# Patient Record
Sex: FEMALE | Race: WHITE | NOT HISPANIC OR LATINO | Employment: OTHER | URBAN - METROPOLITAN AREA
[De-identification: names, ages, dates, MRNs, and addresses within clinical notes are randomized per-mention and may not be internally consistent; named-entity substitution may affect disease eponyms.]

---

## 2017-01-04 ENCOUNTER — OFFICE VISIT (OUTPATIENT)
Dept: FAMILY MEDICINE | Facility: CLINIC | Age: 62
End: 2017-01-04
Payer: COMMERCIAL

## 2017-01-04 VITALS
DIASTOLIC BLOOD PRESSURE: 82 MMHG | WEIGHT: 119.69 LBS | SYSTOLIC BLOOD PRESSURE: 127 MMHG | BODY MASS INDEX: 22.6 KG/M2 | HEART RATE: 82 BPM | OXYGEN SATURATION: 98 % | HEIGHT: 61 IN | TEMPERATURE: 98 F

## 2017-01-04 DIAGNOSIS — R29.898 RIGHT ARM WEAKNESS: ICD-10-CM

## 2017-01-04 DIAGNOSIS — Z13.9 SCREENING: ICD-10-CM

## 2017-01-04 DIAGNOSIS — F19.982 DRUG INDUCED INSOMNIA: Primary | ICD-10-CM

## 2017-01-04 DIAGNOSIS — G20.A1 PARKINSON'S DISEASE: ICD-10-CM

## 2017-01-04 PROCEDURE — 99213 OFFICE O/P EST LOW 20 MIN: CPT | Mod: S$GLB,,, | Performed by: FAMILY MEDICINE

## 2017-01-04 PROCEDURE — 99999 PR PBB SHADOW E&M-EST. PATIENT-LVL III: CPT | Mod: PBBFAC,,, | Performed by: FAMILY MEDICINE

## 2017-01-04 RX ORDER — TEMAZEPAM 30 MG/1
CAPSULE ORAL
Qty: 30 CAPSULE | Refills: 5 | Status: SHIPPED | OUTPATIENT
Start: 2017-01-04 | End: 2017-05-25

## 2017-01-04 NOTE — PROGRESS NOTES
"Subjective:      Patient ID: Awilda Segovia is a 61 y.o. female.    Chief Complaint: insomnia  HPI she has had insomnia for about a year and she has been on restoril for at least 6 months.  She states that this dose has helped her a lot with this.  She also uses 2 gabapentin for this at night.  She has used meloxicam or advil for her pain on the right side at times.     Health Maintenance Due   Topic Date Due    Hepatitis C Screening  1955    TETANUS VACCINE  04/07/1971    Pap Smear  04/07/1974    Zoster Vaccine  04/07/2013    Mammogram  03/04/2017         Review of Systems    Objective:     Visit Vitals    /82    Pulse 82    Temp 97.9 °F (36.6 °C) (Oral)    Ht 5' 1" (1.549 m)    Wt 54.3 kg (119 lb 11.4 oz)    SpO2 98%    BMI 22.62 kg/m2       Physical Exam   Constitutional: She is oriented to person, place, and time. She appears well-developed and well-nourished. She appears distressed.   HENT:   Head: Normocephalic and atraumatic.   Eyes: EOM are normal. Pupils are equal, round, and reactive to light.   Neck: Normal range of motion. Neck supple.   Cardiovascular: Regular rhythm.    No murmur heard.  Pulmonary/Chest: Effort normal. No respiratory distress. She has no wheezes. She has no rales.   Abdominal: Soft. She exhibits no distension. There is no tenderness.   Neurological: She is alert and oriented to person, place, and time.   She has weakness on the right arm.   Skin: She is diaphoretic.       Assessment:     1. Drug induced insomnia    2. Screening    3. Parkinson's disease    4. Right arm weakness        Plan:   Diagnoses and all orders for this visit:    Drug induced insomnia  -     temazepam (RESTORIL) 30 mg capsule; TAKE 1 CAPSULE BY MOUTH EVERY NIGHT AT BEDTIME IF NEEDED    Screening  -     Comprehensive metabolic panel; Future  -     Lipid panel; Future  -     Hepatitis C antibody; Future    Parkinson's disease  -     Ambulatory Referral to Physical/Occupational Therapy    Right " arm weakness  -     Ambulatory Referral to Physical/Occupational Therapy

## 2017-01-04 NOTE — MR AVS SNAPSHOT
[facility]  [address] [facility] [address] [address]  Phone: [phone]  Fax: [phone]                  Awilda Segovia   2017 10:40 AM   Office Visit    Description:  Female : 1955   Provider:  Gato Ivan MD   Department:  [facility]           Diagnoses this Visit        Comments    Drug induced insomnia    -  Primary     Screening         Parkinson's disease         Right arm weakness                To Do List           Future Appointments        Provider Department Dept Phone    2017 11:50 AM LABORATORY, [place] [facility] [phone]    2017 11:20 AM Pamela Garcia MD [facility] Neurology [phone]      Goals (5 Years of Data)     None       These Medications        Disp Refills Start End    temazepam (RESTORIL) 30 mg capsule 30 capsule 5 2017     TAKE 1 CAPSULE BY MOUTH EVERY NIGHT AT BEDTIME IF NEEDED    Pharmacy: [facility]/pharmacy #[id number] - [place], [place] - [address] #: [phone]         [facility] On Call     [facility] On Call Nurse Care Line -  Assistance  Registered nurses in the [facility] On Call Center provide clinical advisement, health education, appointment booking, and other advisory services.  Call for this free service at [phone].             Medications           Message regarding Medications     Verify the changes and/or additions to your medication regime listed below are the same as discussed with your clinician today.  If any of these changes or additions are incorrect, please notify your healthcare provider.             Verify that the below list of medications is an accurate representation of the medications you are currently taking.  If none reported, the list may be blank. If incorrect, please contact your healthcare provider. Carry this list with you in case of emergency.           Current Medications     calcium-vitamin D3 (CALCIUM 500 + D) 500 mg(1,250mg) -200 unit per tablet  "Take 1 tablet by mouth 3 (three) times daily with meals.    carbidopa (LODOSYN) 25 mg tablet Take 1 tablet (25 mg total) by mouth 4 (four) times daily.    carbidopa-levodopa  mg (SINEMET CR)  mg TbSR Take 2 tablets by mouth 3 (three) times daily.    citalopram (CELEXA) 10 MG tablet Take 1 tablet (10 mg total) by mouth once daily.    cyclobenzaprine (FLEXERIL) 5 MG tablet Take 5 mg by mouth 3 (three) times daily as needed for Muscle spasms.    ESTRING 2 mg vaginal ring INSERT 1 VAGINAL RING(S) EVERY 3 MONTHS    gabapentin (NEURONTIN) 100 MG capsule Take 1-2 capsules (100-200 mg total) by mouth every evening.    temazepam (RESTORIL) 30 mg capsule TAKE 1 CAPSULE BY MOUTH EVERY NIGHT AT BEDTIME IF NEEDED           Clinical Reference Information           Vital Signs - Last Recorded  Most recent update: 1/4/2017 10:43 AM by Leyla Dinh MA    BP Pulse Temp Ht Wt SpO2    127/82 82 97.9 °F (36.6 °C) (Oral) 5' 1" (1.549 m) 54.3 kg (119 lb 11.4 oz) 98%    BMI                22.62 kg/m2          Blood Pressure          Most Recent Value    BP  127/82      Allergies as of 1/4/2017     Silver Nitrate    Demerol [Meperidine]    Reglan [Metoclopramide Hcl]    Risperdal [Risperidone]      Immunizations Administered on Date of Encounter - 1/4/2017     None      Orders Placed During Today's Visit      Normal Orders This Visit    Ambulatory Referral to Physical/Occupational Therapy     Future Labs/Procedures Expected by Expires    Comprehensive metabolic panel  1/4/2017 (Approximate) 1/4/2018    Hepatitis C antibody  1/4/2017 (Approximate) 1/4/2018    Lipid panel  1/4/2017 (Approximate) 1/4/2018      "

## 2017-01-06 ENCOUNTER — LAB VISIT (OUTPATIENT)
Dept: LAB | Facility: HOSPITAL | Age: 62
End: 2017-01-06
Attending: FAMILY MEDICINE
Payer: COMMERCIAL

## 2017-01-06 DIAGNOSIS — Z13.9 SCREENING: ICD-10-CM

## 2017-01-06 LAB
ALBUMIN SERPL BCP-MCNC: 4.4 G/DL
ALP SERPL-CCNC: 72 U/L
ALT SERPL W/O P-5'-P-CCNC: 6 U/L
ANION GAP SERPL CALC-SCNC: 8 MMOL/L
AST SERPL-CCNC: 27 U/L
BILIRUB SERPL-MCNC: 0.6 MG/DL
BUN SERPL-MCNC: 20 MG/DL
CALCIUM SERPL-MCNC: 9.7 MG/DL
CHLORIDE SERPL-SCNC: 104 MMOL/L
CHOLEST/HDLC SERPL: 2.3 {RATIO}
CO2 SERPL-SCNC: 29 MMOL/L
CREAT SERPL-MCNC: 0.8 MG/DL
EST. GFR  (AFRICAN AMERICAN): >60 ML/MIN/1.73 M^2
EST. GFR  (NON AFRICAN AMERICAN): >60 ML/MIN/1.73 M^2
GLUCOSE SERPL-MCNC: 91 MG/DL
HDL/CHOLESTEROL RATIO: 42.9 %
HDLC SERPL-MCNC: 168 MG/DL
HDLC SERPL-MCNC: 72 MG/DL
LDLC SERPL CALC-MCNC: 84.2 MG/DL
NONHDLC SERPL-MCNC: 96 MG/DL
POTASSIUM SERPL-SCNC: 4.5 MMOL/L
PROT SERPL-MCNC: 7.6 G/DL
SODIUM SERPL-SCNC: 141 MMOL/L
TRIGL SERPL-MCNC: 59 MG/DL

## 2017-01-06 PROCEDURE — 36415 COLL VENOUS BLD VENIPUNCTURE: CPT | Mod: PO

## 2017-01-06 PROCEDURE — 86803 HEPATITIS C AB TEST: CPT

## 2017-01-06 PROCEDURE — 80053 COMPREHEN METABOLIC PANEL: CPT

## 2017-01-06 PROCEDURE — 80061 LIPID PANEL: CPT

## 2017-01-09 LAB — HCV AB SERPL QL IA: NEGATIVE

## 2017-01-31 ENCOUNTER — OFFICE VISIT (OUTPATIENT)
Dept: NEUROLOGY | Facility: CLINIC | Age: 62
End: 2017-01-31
Payer: COMMERCIAL

## 2017-01-31 VITALS
HEIGHT: 61 IN | BODY MASS INDEX: 22.76 KG/M2 | HEART RATE: 106 BPM | DIASTOLIC BLOOD PRESSURE: 86 MMHG | SYSTOLIC BLOOD PRESSURE: 133 MMHG | WEIGHT: 120.56 LBS

## 2017-01-31 DIAGNOSIS — R47.89: ICD-10-CM

## 2017-01-31 DIAGNOSIS — G20.A1 PARKINSON'S DISEASE: Primary | ICD-10-CM

## 2017-01-31 PROCEDURE — 99999 PR PBB SHADOW E&M-EST. PATIENT-LVL III: CPT | Mod: PBBFAC,,, | Performed by: PSYCHIATRY & NEUROLOGY

## 2017-01-31 PROCEDURE — 99214 OFFICE O/P EST MOD 30 MIN: CPT | Mod: S$GLB,,, | Performed by: PSYCHIATRY & NEUROLOGY

## 2017-01-31 RX ORDER — DIAZEPAM 2 MG/1
1-2 TABLET ORAL EVERY 6 HOURS PRN
Qty: 120 TABLET | Refills: 4 | Status: SHIPPED | OUTPATIENT
Start: 2017-01-31 | End: 2018-01-19

## 2017-01-31 RX ORDER — CYCLOBENZAPRINE HCL 5 MG
TABLET ORAL
Qty: 90 TABLET | Refills: 5 | Status: SHIPPED | OUTPATIENT
Start: 2017-01-31 | End: 2017-09-24 | Stop reason: SDUPTHER

## 2017-01-31 RX ORDER — DIAZEPAM 2 MG/1
2 TABLET ORAL EVERY 6 HOURS PRN
COMMUNITY
End: 2017-01-31 | Stop reason: SDUPTHER

## 2017-01-31 NOTE — MR AVS SNAPSHOT
[facility]  [address]  [facility] [address]  Phone: [phone]  Fax: [phone]                  Awilda CHAPA Luca   2017 11:20 AM   Office Visit    Description:  Female : 1955   Provider:  Pamela Garcia MD   Department:  [facility]           Diagnoses this Visit        Comments    Parkinson's disease    -  Primary            To Do List           Goals (5 Years of Data)     None       These Medications        Disp Refills Start End    diazePAM (VALIUM) 2 MG tablet 120 tablet 4 2017     Take 0.5-1 tablets (1-2 mg total) by mouth every 6 (six) hours as needed for Anxiety. - Oral    Pharmacy: [facility]/pharmacy #[id number] - [place] - [address] #: [phone]         [facility] On Call     [facility] On Call Nurse Care Line -  Assistance  Registered nurses in the [facility] On Call Center provide clinical advisement, health education, appointment booking, and other advisory services.  Call for this free service at [phone].             Medications           Message regarding Medications     Verify the changes and/or additions to your medication regime listed below are the same as discussed with your clinician today.  If any of these changes or additions are incorrect, please notify your healthcare provider.        START taking these NEW medications        Refills    diazePAM (VALIUM) 2 MG tablet 4    Sig: Take 0.5-1 tablets (1-2 mg total) by mouth every 6 (six) hours as needed for Anxiety.    Class: Normal    Route: Oral           Verify that the below list of medications is an accurate representation of the medications you are currently taking.  If none reported, the list may be blank. If incorrect, please contact your healthcare provider. Carry this list with you in case of emergency.           Current Medications     calcium-vitamin D3 (CALCIUM 500 + D) 500 mg(1,250mg) -200 unit per tablet Take 1 tablet by mouth 3 (three) times daily with meals.     "carbidopa (LODOSYN) 25 mg tablet Take 1 tablet (25 mg total) by mouth 4 (four) times daily.    carbidopa-levodopa  mg (SINEMET CR)  mg TbSR Take 2 tablets by mouth 3 (three) times daily.    citalopram (CELEXA) 10 MG tablet Take 1 tablet (10 mg total) by mouth once daily.    cyclobenzaprine (FLEXERIL) 5 MG tablet TAKE 1 TABLET BY MOUTH 3 TIMES A DAY AS NEEDED FOR MUSCLE SPASMS.    diazePAM (VALIUM) 2 MG tablet Take 0.5-1 tablets (1-2 mg total) by mouth every 6 (six) hours as needed for Anxiety.    ESTRING 2 mg vaginal ring INSERT 1 VAGINAL RING(S) EVERY 3 MONTHS    gabapentin (NEURONTIN) 100 MG capsule Take 1-2 capsules (100-200 mg total) by mouth every evening.    temazepam (RESTORIL) 30 mg capsule TAKE 1 CAPSULE BY MOUTH EVERY NIGHT AT BEDTIME IF NEEDED           Clinical Reference Information           Vital Signs - Last Recorded  Most recent update: 1/31/2017 11:34 AM by Pratibha Raymond MA    BP Pulse Ht Wt BMI    133/86 106 5' 1" (1.549 m) 54.7 kg (120 lb 9.5 oz) 22.79 kg/m2      Blood Pressure          Most Recent Value    BP  133/86      Allergies as of 1/31/2017     Silver Nitrate    Demerol [Meperidine]    Reglan [Metoclopramide Hcl]    Risperdal [Risperidone]      Immunizations Administered on Date of Encounter - 1/31/2017     None      Instructions    In order to try and differentiate between rigid-type parkinson's disease and one of the atypical types, we need to know your response to levodopa.  Does this medication DO anything?    To test this, you'll need an observer (friend or ).  1.  Have your observer watch and video tape you waving with your hands, then tapping your feet.  2.  Take TWO carbidopa-levodopa  and one carbidopa.  3.  Wait 2 hours, then have your observer watch and video tape you waving your hands again.  Also tapping your feet.      Do this test on at least TWO mornings.    If you get too nauseated or vomit, don't try the test again.  Just let me know.    Even " if your observer cannot tell a difference in your movements on and off the medication, bring video to next appointment for Karina or DARRYN to review.

## 2017-01-31 NOTE — PATIENT INSTRUCTIONS
In order to try and differentiate between rigid-type parkinson's disease and one of the atypical types, we need to know your response to levodopa.  Does this medication DO anything?    To test this, you'll need an observer (friend or ).  1.  Have your observer watch and video tape you waving with your hands, then tapping your feet.  2.  Take TWO carbidopa-levodopa  and one carbidopa.  3.  Wait 2 hours, then have your observer watch and video tape you waving your hands again.  Also tapping your feet.      Do this test on at least TWO mornings.    If you get too nauseated or vomit, don't try the test again.  Just let me know.    Even if your observer cannot tell a difference in your movements on and off the medication, bring video to next appointment for Karina or DARRYN to review.

## 2017-01-31 NOTE — PROGRESS NOTES
"Awilda CONSTANTINO Chief Complaints during this visit:  f/u Patient visit for  Parkinsonism      Referring Physician:     Gato Ivan MD  [address] [place] [address]       Primary Care Physician:  Gato Ivan MD  [address] [facility] [address]      History of present illness:   61 y.o. W seen in f/u for Parkinsonism.  Accompanied by a friend.  Has been seeing Karina in the interim who has been wondering if patient may have MSA.  One fall last month when tripping at top of stairs.    Lability has improved with celexa.    Sinemet CR + carbidopa has reduced nausea and she is tolerating.  However not sure the 4th dose of sinemet has had any effect.    Voice has been changing in last couple of weeks.  No trouble with swallowing.    Home orthostatics:  1/29:  130/70, 96 laying down, 122/66, 94 standing  1/30:  126/70, 92 laying down, 110/70, 100 standing    Interval history 5/26/16:  Complains of severe spasm mid back every afternoon into the evening for past couple months.   says she is tender on spinous process, but unable to detect a spasming muscle.  Finally goes away when her restoril takes effect.  Mobic helps a little.  Wakes up intermittently at night, 1-3am, wide awake.  Restless and difficulty getting comfortable in the evenings.  Says feels like "locusts" all over her.    Right hand ring finger as well as foot tingling and numb.  Never started the neudexta because of potential interaction with selegiline.  Despite this, she has done better with tears.    From my note 2/12/16:  Annoyed by a restless feeling in her right foot that worsens as she prepares to bed.  Also having severe insomnia.  Right hand is still swollen and had poor dexterity, and right foot rolling in, but no pain in right side any longer.  Cries easily, whether sad or happy.  She denies feeling depressed and is resistant to going back on prozac.    From my note 12/2/15:  ... seen in consultation at " "the request of  Dr. Segovia () for evaluation of joint pains and fatigue.  Symptoms started as joint pain, swelling in right hand about 6-8 months ago.  When typing, letters skipped with right hand.  Otherwise feels "fine."  Right arm, hand, leg, foot still have pain, intermittently.  Yoga feels good.  Seen by Kelley and given presumptive diagnosis of PD.  Had control of depression with wellbutrin and prozac, now off in past 3 weeks.  Recent active dreaming (screaming, laughing, kicks).  Not falling asleep any more.  No problems with voice.       II.  Review of systems:  As in HPI, otherwise, balance 3 systems reviewed and are negative.    III.  Past Medical History   Diagnosis Date    H/O hysterectomy for benign disease     Menopause present     Osteopenia     Parkinson disease 12/2015    Sprain of left foot      2008     Family History   Problem Relation Age of Onset    Diabetes Mother     Cataracts Mother     Hypertension Mother     Cancer Maternal Aunt      breast    Cancer Maternal Uncle      brain , bone     Cancer Paternal Aunt      uterine    Diabetes Maternal Grandfather     Cataracts Father      Social History     Social History    Marital status:      Spouse name: N/A    Number of children: N/A    Years of education: N/A     Social History Main Topics    Smoking status: Never Smoker    Smokeless tobacco: Never Used    Alcohol use 0.6 oz/week     1 Glasses of wine per week      Comment: social    Drug use: No    Sexual activity: Yes     Partners: Male     Other Topics Concern    None     Social History Narrative    Works for [employer]          Current Outpatient Prescriptions   Medication Sig Dispense Refill    calcium-vitamin D3 (CALCIUM 500 + D) 500 mg(1,250mg) -200 unit per tablet Take 1 tablet by mouth 3 (three) times daily with meals.      carbidopa (LODOSYN) 25 mg tablet Take 1 tablet (25 mg total) by mouth 4 (four) times daily. 120 tablet 11    " "carbidopa-levodopa  mg (SINEMET CR)  mg TbSR Take 2 tablets by mouth 3 (three) times daily. (Patient taking differently: Take 1 tablet by mouth 3 (three) times daily. ) 540 tablet 3    citalopram (CELEXA) 10 MG tablet Take 1 tablet (10 mg total) by mouth once daily. 30 tablet 11    cyclobenzaprine (FLEXERIL) 5 MG tablet TAKE 1 TABLET BY MOUTH 3 TIMES A DAY AS NEEDED FOR MUSCLE SPASMS. 90 tablet 5    diazePAM (VALIUM) 2 MG tablet Take 2 mg by mouth every 6 (six) hours as needed for Anxiety. Take half tablet by month every 12 hours as needed      ESTRING 2 mg vaginal ring INSERT 1 VAGINAL RING(S) EVERY 3 MONTHS  1    gabapentin (NEURONTIN) 100 MG capsule Take 1-2 capsules (100-200 mg total) by mouth every evening. 60 capsule 11    temazepam (RESTORIL) 30 mg capsule TAKE 1 CAPSULE BY MOUTH EVERY NIGHT AT BEDTIME IF NEEDED 30 capsule 5    [DISCONTINUED] venlafaxine (EFFEXOR-XR) 75 MG 24 hr capsule Take 1 capsule (75 mg total) by mouth once daily. 30 capsule 11     No current facility-administered medications for this visit.       PRIOR NEURO MEDICATIONS TRIED:  na    Review of patient's allergies indicates:   Allergen Reactions    Silver nitrate Anaphylaxis     Put wholes skin     Demerol [meperidine]      Can worsen PD    Reglan [metoclopramide hcl]      Can worsen PD    Risperdal [risperidone]      Can worsen PD         IV. Physical Exam (Includes Motor Unified Parkinson's Disease Rating Scale 2008)  Patient taking PD meds?   selegiline  Time since last dose:      na    Vitals:    01/31/17 1132   BP: 133/86   Pulse: 106   Weight: 54.7 kg (120 lb 9.5 oz)   Height: 5' 1" (1.549 m)       General appearance: Well nourished, well developed, no acute distress       -------------------------------------------------------------  Affect: full       Orientation to time & place:  Oriented to time, place, person and situation       Attention & concentration:  Normal attention span and concentration     "   Memory:  Recent and remote memory intact  Language:  Spontaneous, fluent; able to repeat and name objects        Fund of knowledge:  Aware of current events        -------------------------------------------------------  Cranial nerves: normal visual acuity, visual fields full, optic discs not visualized, pupils equal round and reactive, extraocular movements intact,       facial sensation intact, face symmetrical, hearing intact to whisper, palate raises midline, shoulder shrug strength normal, tongue protrudes midline.        -------------------------------------------------------  Muscle Bulk: all 4 extremities normal                                                   Muscle strength:  5/5 in all 4 extremities        No pronator drift  Sensation: All extremities grossly intact to touch          Deep tendon Reflexes: 2 bilateral biceps, triceps and patella        --------------------------------------------------------------  Unified Parkinson's Disease Rating Scale (motor part only)      UPDRS Motor Examination      Speech  0 - Normal.   Facial Expression  0 - Normal.   Rigidity     Neck 0 - Absent.   Upper Extremity: Right 2 - Mild or moderate.   Upper Extremity: Left 0 - Absent.   Lower Extremity: Right 2 - Mild or moderate.   Lower Extremity: Left 0 - Absent.     Finger Taps      right 3 - Severely impaired. Frequent hesitation in initiating movements or arrests in ongoing movement.   left 0 - Normal.   Hand Movements      right 3 - Severely impaired. Frequent hesitation in initiating movements or arrests in ongoing movement.   left 0 - Normal.   Pronation/supination of Hands      right 3 - Severely impaired. Frequent hesitation in initiating movements or arrests in ongoing movement.   left 0 - Normal.     Toe Tapping    right 3 - Severely impaired. Frequent hesitation in initiating movements or arrests in ongoing movement.   left 0 - Normal.     Leg Agility      right 2 - Moderately impaired. Definite and  early fatiguing. May have occasional arrests in movement.   left 0 - Normal.   Arising from Chair  0 - Normal.   Posture  0 - Normal erect.   Gait  1 - Walks slowly, may shuffle with short steps, but no festination (hastening steps) or propulsion.   Freezing of gait 0: Normal: No freezing.    Postural Stability (Response to sudden, strong posterior displacement produced by pull on shoulders while patient erect with eyes open and feet slightly apart.   1 - Retropulsion, but recovers unaided.    Body Bradykinesia and Hypokinesia (Combining slowness, hesitancy, decreased armswing, small amplitude, and poverty of movement in general)  1 - Minimal slowness, giving movement a deliberate character; could be normal for some persons. Possibly reduced amplitude.     Tremor at Rest:      Face, lips, chin 0 - Absent.    Hands:      right 0 - Absent.    left 0 - Absent.    Feet:     right 0 - Absent.    left 0 - Absent.    Constancy of REST tremor: 0: Normal: No tremor.   Postural tremor:    right 0 - Absent.    left 0 - Absent.    Kinetic tremor:    right 0 - Absent.    left 0 - Absent.    Dyskinesias present? No       Total Motor UPDRS:  22      V.  Laboratory/ Radiological Data:       MRI brain 2015 personally reviewed and midbrain appears normal:            Lab Results   Component Value Date    TSH 1.668 06/24/2014         VI. Medical Decision Making  Diagnosis: Parkinson's disease        UPDRS-ADL Scale:  90% Completely independent. Able to do all chores with some degree of slowness, difficulty and impairment. Might take twice as long. Beginning to be aware of difficulty.                  Assessment:    1.  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  I don't think MSA (seems too asymmetrical), but CBD is a possibility.  No ocular or midbrain pathology to suggest PSP.  Levodopa response (or lack thereof) is unclear.  2. Speech changes- loss of lou/ prosody, sounding slow and robotic-like  3.  "Restlessness, "creepy crawlies" may be an RLS variant  4. PD-related pain  5.  Pseudobulbar affect    Treatment plan:  1.  Levodopa test.  See instructions.           2.  Speech therapy           3.                          Tests ordered during this visit:        No orders of the defined types were placed in this encounter.        No Follow-up on file. (has appt 3/1 with Karina)  "

## 2017-01-31 NOTE — LETTER
February 2, 2017      Gato Ivan MD  [address] [facility] [address]           [facility]  [address]  [place] [address]  Phone: [phone]  Fax: [phone]          Patient: Awilda Segovia   MR Number: [id number]   YOB: 1955   Date of Visit: 1/31/2017       Dear Dr. Gato Ivan:    Thank you for referring Awilda Segovia to me for evaluation. Attached you will find relevant portions of my assessment and plan of care.    If you have questions, please do not hesitate to call me. I look forward to following Awilda Segovia along with you.    Sincerely,    Pamela Garcia MD    Enclosure  CC:  No Recipients    If you would like to receive this communication electronically, please contact [email or web address] or [phone] to request more information on [facility] Link access.    For providers and/or their staff who would like to refer a patient to [facility], please contact us through our one-stop-shop provider referral line, [facility], at [phone].    If you feel you have received this communication in error or would no longer like to receive these types of communications, please e-mail [email or web address]

## 2017-03-01 ENCOUNTER — OFFICE VISIT (OUTPATIENT)
Dept: NEUROLOGY | Facility: CLINIC | Age: 62
End: 2017-03-01
Payer: COMMERCIAL

## 2017-03-01 VITALS
BODY MASS INDEX: 22.28 KG/M2 | DIASTOLIC BLOOD PRESSURE: 84 MMHG | HEART RATE: 106 BPM | HEIGHT: 61 IN | WEIGHT: 118 LBS | SYSTOLIC BLOOD PRESSURE: 137 MMHG | RESPIRATION RATE: 16 BRPM | TEMPERATURE: 97 F

## 2017-03-01 DIAGNOSIS — F48.2 PBA (PSEUDOBULBAR AFFECT): ICD-10-CM

## 2017-03-01 DIAGNOSIS — G20.A1 PARKINSON'S DISEASE: Primary | ICD-10-CM

## 2017-03-01 PROCEDURE — 99215 OFFICE O/P EST HI 40 MIN: CPT | Mod: S$GLB,,, | Performed by: NURSE PRACTITIONER

## 2017-03-01 PROCEDURE — 99999 PR PBB SHADOW E&M-EST. PATIENT-LVL III: CPT | Mod: PBBFAC,,, | Performed by: NURSE PRACTITIONER

## 2017-03-01 NOTE — PROGRESS NOTES
Name: Awilda Segovia  MRN: [id number]   CSN: [id number]      Date: 03/01/2017    Referring physician:  No referring provider defined for this encounter.    Chief Complaint / Interval History: Follow-up      History of Present Illness (HPI):    62 yo female with parkinsonism, last seen in office by Dr. Garcia. At that time, she was asked to take video without and with cd/ld 2 hours later. She brings 2 videos to view. She is accompanied by her friend today.     Mornings are consistently her best time. She is not sure if she has always felt better in the mornings. Not sure if has anything to do with the 2nd dose of cd/ld.  After noon, she begins to go down.   She feels her voice has changed since the last time I saw her. She had ST eval this week and was told she does not have lou and pacing. She was told she does not need LOUD as it is not a volume or breath problem. She begins her first ST session tomorrow. She was told she has particular issues with words that start with W and M. Friend adds that she she seems to stammer on the telephone in the evenings. Friend usually only speaks to her on the phone at night.   She is also particiapting in PT and yoga.    She can now put her earrings on with her RH.  She can also pick something off the floor with her RH. These are both things she could not do a few months ago. She is not sure if this is cd/ld or PT. She says they have really made progress with her ROM with PT.     She takes 2 cd/ld CR + cd at 7 AM and the remainder of the doses are 1 cd/ld CR + cd (at noon, 6P and 9:30P).      She has some tremor in the RH and R foot. She also describes an internal truncal tremor that she began to notice this week.   Denies stiffness for the most part on the R side. States that PT has really helped. Has very much helped ROM.   Definitely feels slow.   Balance is off. No falls recently. She does shuffle. Uses cane. No other devices at this point.       From Dr. Garcia's note  "1-31-17...  History of present illness:   61 y.o. W seen in f/u for Parkinsonism. Accompanied by a friend. Has been seeing Karina in the interim who has been wondering if patient may have MSA. One fall last month when tripping at top of stairs.     Lability has improved with celexa.     Sinemet CR + carbidopa has reduced nausea and she is tolerating. However not sure the 4th dose of sinemet has had any effect.     Voice has been changing in last couple of weeks.  No trouble with swallowing.     Home orthostatics:  1/29: 130/70, 96 laying down, 122/66, 94 standing  1/30: 126/70, 92 laying down, 110/70, 100 standing     Assessment:    1. 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 I don't think MSA (seems too asymmetrical), but CBD is a possibility. No ocular or midbrain pathology to suggest PSP. Levodopa response (or lack thereof) is unclear.  2. Speech changes- loss of lou/ prosody, sounding slow and robotic-like  3. Restlessness, "creepy crawlies" may be an RLS variant  4. PD-related pain  5. Pseudobulbar affect     Treatment plan:  1. Levodopa test. See instructions.    2. Speech therapy    Interval history 5/26/16:  Complains of severe spasm mid back every afternoon into the evening for past couple months.  says she is tender on spinous process, but unable to detect a spasming muscle. Finally goes away when her restoril takes effect. Mobic helps a little.  Wakes up intermittently at night, 1-3am, wide awake.  Restless and difficulty getting comfortable in the evenings. Says feels like "locusts" all over her.   Right hand ring finger as well as foot tingling and numb.  Never started the neudexta because of potential interaction with selegiline. Despite this, she has done better with tears.     From my note 2/12/16:  Annoyed by a restless feeling in her right foot that worsens as she prepares to bed. Also having severe insomnia.  Right hand is still swollen and had poor dexterity, " "and right foot rolling in, but no pain in right side any longer.  Cries easily, whether sad or happy. She denies feeling depressed and is resistant to going back on prozac.     From my note 12/2/15:  ... seen in consultation at the request of Dr. Segovia () for evaluation of joint pains and fatigue. Symptoms started as joint pain, swelling in right hand about 6-8 months ago. When typing, letters skipped with right hand. Otherwise feels "fine."  Right arm, hand, leg, foot still have pain, intermittently. Yoga feels good.  Seen by Kelley and given presumptive diagnosis of PD.  Had control of depression with wellbutrin and prozac, now off in past 3 weeks.  Recent active dreaming (screaming, laughing, kicks). Not falling asleep any more.  No problems with voice.      Nonmotor/Premotor ROS:  Dysphagia (HENT)?No  RBD/sleep issues (Constitutional)?Yes  Depression/anxiety (Psychiatric)?better  Fatigue (Constitutional)?Yes- in afternoon  Constipation (GI)?Yes- Miralax very much helps  Urinary issues ()?No  Orthostasis (Cardiovascular)?Yes- with position change- all the time she makes a position change- does not have syncope  Falls (Musculoskeletal)?No- no recent  Cognitive impairment (Neurologic)?No  Psychoses (Psychiatric)?Yes- ddi but not in a very long time  Pain/Paresthesia (Neurologic)?Yes- "little on the numb side (Right side)  Visual changes (Eyes)?Yes- blurred  Stridor / SOB (Pulm)?No    Past Medical History: The patient  has a past medical history of H/O hysterectomy for benign disease; Menopause present; Osteopenia; Parkinson disease (12/2015); and Sprain of left foot.    Social History: The patient  reports that she has never smoked. She has never used smokeless tobacco. She reports that she drinks about 0.6 oz of alcohol per week  She reports that she does not use illicit drugs.    Family History: Their family history includes Cancer in her maternal aunt, maternal uncle, and paternal aunt; Cataracts in her " "father and mother; Diabetes in her maternal grandfather and mother; Hypertension in her mother.    Allergies: Silver nitrate; Demerol [meperidine]; Reglan [metoclopramide hcl]; and Risperdal [risperidone]     Meds:   Current Outpatient Prescriptions on File Prior to Visit   Medication Sig Dispense Refill    calcium-vitamin D3 (CALCIUM 500 + D) 500 mg(1,250mg) -200 unit per tablet Take 1 tablet by mouth 3 (three) times daily with meals.      carbidopa (LODOSYN) 25 mg tablet Take 1 tablet (25 mg total) by mouth 4 (four) times daily. 120 tablet 11    carbidopa-levodopa  mg (SINEMET CR)  mg TbSR Take 2 tablets by mouth 3 (three) times daily. (Patient taking differently: Take 1 tablet by mouth 4 (four) times daily. ) 540 tablet 3    citalopram (CELEXA) 10 MG tablet Take 1 tablet (10 mg total) by mouth once daily. 30 tablet 11    cyclobenzaprine (FLEXERIL) 5 MG tablet TAKE 1 TABLET BY MOUTH 3 TIMES A DAY AS NEEDED FOR MUSCLE SPASMS. 90 tablet 5    diazePAM (VALIUM) 2 MG tablet Take 0.5-1 tablets (1-2 mg total) by mouth every 6 (six) hours as needed for Anxiety. 120 tablet 4    ESTRING 2 mg vaginal ring INSERT 1 VAGINAL RING(S) EVERY 3 MONTHS  1    gabapentin (NEURONTIN) 100 MG capsule Take 1-2 capsules (100-200 mg total) by mouth every evening. 60 capsule 11    temazepam (RESTORIL) 30 mg capsule TAKE 1 CAPSULE BY MOUTH EVERY NIGHT AT BEDTIME IF NEEDED 30 capsule 5    [DISCONTINUED] venlafaxine (EFFEXOR-XR) 75 MG 24 hr capsule Take 1 capsule (75 mg total) by mouth once daily. 30 capsule 11     No current facility-administered medications on file prior to visit.        Exam:  /84 (BP Location: Left arm, Patient Position: Sitting, BP Method: Automatic)  Pulse 106  Temp 97 °F (36.1 °C) (Oral)   Resp 16  Ht 5' 1" (1.549 m)  Wt 53.5 kg (118 lb)  BMI 22.3 kg/m2    Last dose cd/ld was 0800- exam time 1230- due now    Constitutional  Well-developed, well-nourished, appears stated age "   Neurological    * Mental status       - Orientation  Oriented to person, place, time, and situation     - Memory   Intact recent and remote     - Attention/concentration  Attentive, vigilant during exam       ..III.  MOTOR EXAMINATION - last dose cd/ld was 0700- exam time 1230- due now       Speech  1 - Slight loss of expression, dictation, and/or volumn.   Facial Expression  0 - Normal.   Tremor at Rest:      Face, lips, chin 0 - Absent.    Hands:      right 0 - Absent.    left 0 - Absent.    Feet:      right 0 - Absent.    left 0 - Absent.    Action or Postural Tremor of Hands      right 1 - Slight; present with action.   left 0 - Absent.    Rigidity      Neck 1 - Slight or detectable only when activated by mirror or other movements.   Upper Extremity: Right 2 - Mild or moderate.   Upper Extremity: Left 1 - Slight or detectable only when activated by mirror or other movements.   Lower Extremity: Right 2 - Mild or moderate.   Lower Extremity: Left 1 - Slight or detectable only when activated by mirror or other movements.   Finger Taps      right 3 - Severely impaired. Frequent hesitation in initiating movements or arrests in ongoing movement.   left 1 - Mild slowing and/or reduction in amplitude.   Hand Movements      right 3 - Severely impaired. Frequent hesitation in initiating movements or arrests in ongoing movement.   left 1 - Mild slowing and/or reduction in amplitude.   Rapid Alternating Movements of Hands      right 3 - Severely impaired. Frequent hesitation in initiating movements or arrests in ongoing movement.   left 0 - Normal.   Leg Agility      right 3 - Severely impaired. Frequent hesitation in initiating movements or arrests in ongoing movement.   left 0 - Normal.   Arising from Chair  0 - Normal.   Posture  1 - Not quite erect, slightly stooped posture; could be normal for older person.   Gait  1 - Walks slowly, may shuffle with short steps, but no festination (hastening steps) or propulsion.    Postural Stability (Response to sudden, strong posterior displacement produced by pull on shoulders while patient erect with eyes open and feet slightly apart. Patient is prepared, and can have had some practice runs.)  Does move her legs but still caught by examiner   Body Bradykinesia and Hypokinesia (Combining slowness, hesitancy, decreased armswing, small amplitude, and poverty of movement in general)  2 - Mild degree of slowness and poverty of movement which is definitely abnormal.     Definitely requires more effort to use the R side but is able to do so. No construction apraxias.      Laboratory/Radiological:  - Results:  Lab Visit on 01/06/2017   Component Date Value Ref Range Status    Sodium 01/06/2017 141  136 - 145 mmol/L Final    Potassium 01/06/2017 4.5  3.5 - 5.1 mmol/L Final    Chloride 01/06/2017 104  95 - 110 mmol/L Final    CO2 01/06/2017 29  23 - 29 mmol/L Final    Glucose 01/06/2017 91  70 - 110 mg/dL Final    BUN, Bld 01/06/2017 20  8 - 23 mg/dL Final    Creatinine 01/06/2017 0.8  0.5 - 1.4 mg/dL Final    Calcium 01/06/2017 9.7  8.7 - 10.5 mg/dL Final    Total Protein 01/06/2017 7.6  6.0 - 8.4 g/dL Final    Albumin 01/06/2017 4.4  3.5 - 5.2 g/dL Final    Total Bilirubin 01/06/2017 0.6  0.1 - 1.0 mg/dL Final    Alkaline Phosphatase 01/06/2017 72  55 - 135 U/L Final    AST 01/06/2017 27  10 - 40 U/L Final    ALT 01/06/2017 6* 10 - 44 U/L Final    Anion Gap 01/06/2017 8  8 - 16 mmol/L Final    eGFR if African American 01/06/2017 >60.0  >60 mL/min/1.73 m^2 Final    eGFR if non African American 01/06/2017 >60.0  >60 mL/min/1.73 m^2 Final    Cholesterol 01/06/2017 168  120 - 199 mg/dL Final    Triglycerides 01/06/2017 59  30 - 150 mg/dL Final    HDL 01/06/2017 72  40 - 75 mg/dL Final    LDL Cholesterol 01/06/2017 84.2  63.0 - 159.0 mg/dL Final    HDL/Chol Ratio 01/06/2017 42.9  20.0 - 50.0 % Final    Total Cholesterol/HDL Ratio 01/06/2017 2.3  2.0 - 5.0 Final    Non-HDL  "Cholesterol 01/06/2017 96  mg/dL Final    Hepatitis C Ab 01/06/2017 Negative   Final       - Independent review of images: MRI brain w & w/o 6-2015                Impression        MRI brain with and without contrast is within normal limits.      Electronically signed by: ARISTIDES BELLE MD  Date: 06/23/15         Diagnoses:          Parkinsonism- defintiely more prominent on R side- doess have some subtle features on L side noted today, still suspect poor l-dopa response and by video there is little if any objective difference   -Still reports dizziness everytime she changes positions but not to point of syncope, has improved with increased fluids   -Remaining in differentials is MSA vs CBD  Speech changes- loss of lou/ prosody, sounding slow and robotic-like  Restlessness, "creepy crawlies" may be an RLS variant  PD-related pain  Pseudobulbar affect- improved on citalopram      Medical Decision Making:    Options:  1) Continues med regimen without change.   2) Increase cd/ld CR noon dose to 2 tabs to see if this helps afternoon fatigue and cont other doses without change.  3) Wean slowly off cd/ld to see if she can appreciate any difference one way or another.  4) Repeat MRI brain and compare to June 2015 to see if any changes.   She would like to think about these options and discuss with her . She will let me know what she decides.      Did watch videos. Some subtle improvement after 2 hours of each dose of cd/ld CR in regards to hypokinesia only in the RH- otherwise, no appreciable difference in motor exam. What struck me more was the 2nd day video and her facial expressions OFF compared to ON. She was clearly more animated 2 hours after dose on the second day. Did not appreciate this on the the DAY 1 video. Both videos were taken at same time 0730 and 0930 one day apart.     Discussed iPD vs atypicals and how these are treated and differentiated.    Discussed importance of her continued work with PT ST " and yoga.     Follow up with Dr. Garcia in 3 months.     I spent 45 minutes face-to-face with the patient and friend with >50% of the time spent with counseling and education regarding:  - results of data, diagnosis, and recommendations stated above  - the prognosis of parkinsonisms  - risks and benefits of cd/ld  - importance of diet and exercise    Karina Webb, ASTON, NP-C  Division of Movement and Memory Disorders  [facility]  [phone]

## 2017-03-01 NOTE — Clinical Note
Follow up 3 mos with deborah orantes poss but she is willing to go to [facility]

## 2017-03-22 ENCOUNTER — TELEPHONE (OUTPATIENT)
Dept: NEUROLOGY | Facility: CLINIC | Age: 62
End: 2017-03-22

## 2017-03-22 ENCOUNTER — PATIENT MESSAGE (OUTPATIENT)
Dept: NEUROLOGY | Facility: CLINIC | Age: 62
End: 2017-03-22

## 2017-03-22 DIAGNOSIS — R48.2 APRAXIA: ICD-10-CM

## 2017-03-22 DIAGNOSIS — G20.C PARKINSONISM, UNSPECIFIED PARKINSONISM TYPE: Primary | ICD-10-CM

## 2017-03-23 NOTE — TELEPHONE ENCOUNTER
Contacted patient and scheduled MRI at [facility] on 4/1 @10:30am.  Patient verbally expressed understanding

## 2017-04-01 RX ORDER — ONDANSETRON 4 MG/1
4 TABLET, ORALLY DISINTEGRATING ORAL EVERY 6 HOURS PRN
Qty: 30 TABLET | Refills: 3 | Status: SHIPPED | OUTPATIENT
Start: 2017-04-01 | End: 2017-09-13

## 2017-04-06 ENCOUNTER — PATIENT MESSAGE (OUTPATIENT)
Dept: FAMILY MEDICINE | Facility: CLINIC | Age: 62
End: 2017-04-06

## 2017-04-10 ENCOUNTER — HOSPITAL ENCOUNTER (OUTPATIENT)
Dept: RADIOLOGY | Facility: HOSPITAL | Age: 62
Discharge: HOME | End: 2017-04-10
Attending: NURSE PRACTITIONER
Payer: COMMERCIAL

## 2017-04-10 DIAGNOSIS — G20.C PARKINSONISM, UNSPECIFIED PARKINSONISM TYPE: ICD-10-CM

## 2017-04-10 DIAGNOSIS — R48.2 APRAXIA: ICD-10-CM

## 2017-04-10 PROCEDURE — 70551 MRI BRAIN STEM W/O DYE: CPT | Mod: TC,PO

## 2017-04-10 PROCEDURE — 70551 MRI BRAIN STEM W/O DYE: CPT | Mod: 26,,, | Performed by: RADIOLOGY

## 2017-05-17 ENCOUNTER — HOSPITAL ENCOUNTER (OUTPATIENT)
Dept: RADIOLOGY | Facility: HOSPITAL | Age: 62
Discharge: HOME | End: 2017-05-17
Attending: NURSE PRACTITIONER
Payer: COMMERCIAL

## 2017-05-17 ENCOUNTER — OFFICE VISIT (OUTPATIENT)
Dept: FAMILY MEDICINE | Facility: CLINIC | Age: 62
End: 2017-05-17
Payer: COMMERCIAL

## 2017-05-17 VITALS
HEART RATE: 117 BPM | BODY MASS INDEX: 21.17 KG/M2 | WEIGHT: 115.06 LBS | TEMPERATURE: 98 F | HEIGHT: 62 IN | SYSTOLIC BLOOD PRESSURE: 133 MMHG | DIASTOLIC BLOOD PRESSURE: 72 MMHG

## 2017-05-17 DIAGNOSIS — Z12.31 SCREENING MAMMOGRAM, ENCOUNTER FOR: ICD-10-CM

## 2017-05-17 DIAGNOSIS — Z01.419 WELL WOMAN EXAM WITH ROUTINE GYNECOLOGICAL EXAM: Primary | ICD-10-CM

## 2017-05-17 DIAGNOSIS — E28.39 ESTROGEN DEFICIENCY: ICD-10-CM

## 2017-05-17 DIAGNOSIS — Z78.0 MENOPAUSE: ICD-10-CM

## 2017-05-17 PROCEDURE — 77067 SCR MAMMO BI INCL CAD: CPT | Mod: TC

## 2017-05-17 PROCEDURE — 99213 OFFICE O/P EST LOW 20 MIN: CPT | Mod: S$GLB,,, | Performed by: NURSE PRACTITIONER

## 2017-05-17 PROCEDURE — 99999 PR PBB SHADOW E&M-EST. PATIENT-LVL IV: CPT | Mod: PBBFAC,,, | Performed by: NURSE PRACTITIONER

## 2017-05-17 PROCEDURE — 77063 BREAST TOMOSYNTHESIS BI: CPT | Mod: 26,,, | Performed by: RADIOLOGY

## 2017-05-17 PROCEDURE — 77067 SCR MAMMO BI INCL CAD: CPT | Mod: 26,,, | Performed by: RADIOLOGY

## 2017-05-17 NOTE — MR AVS SNAPSHOT
[facility]  [address] [place] [address]  Phone: [phone]  Fax: [phone]                  Awilda CHAPA Luca   2017 10:20 AM   Office Visit    Description:  Female : 1955   Provider:  Bnoita Hoyos NP   Department:  [facility]           Reason for Visit     estrogen ring           Diagnoses this Visit        Comments    Well woman exam with routine gynecological exam    -  Primary Estrogen ring    Screening mammogram, encounter for         Estrogen deficiency                To Do List           Future Appointments        Provider Department Dept Phone    2017 10:45 AM [facility] DEXA[facility] [phone]    2017 11:20 AM Pamela Garcia MD [facility] [phone]      Goals (5 Years of Data)     None      [facility]     [facility] -  Assistance  Unless otherwise directed by your provider, please contact [facility], our nurse care line that is available for  assistance.     Registered nurses in the [facility] provide: appointment scheduling, clinical advisement, health education, and other advisory services.  Call: [phone] (toll free)               Medications           Message regarding Medications     Verify the changes and/or additions to your medication regime listed below are the same as discussed with your clinician today.  If any of these changes or additions are incorrect, please notify your healthcare provider.             Verify that the below list of medications is an accurate representation of the medications you are currently taking.  If none reported, the list may be blank. If incorrect, please contact your healthcare provider. Carry this list with you in case of emergency.           Current Medications     calcium-vitamin D3 (CALCIUM 500 + D) 500 mg(1,250mg) -200 unit per tablet Take 1 tablet by mouth 3 (three) times daily with  "meals.    carbidopa (LODOSYN) 25 mg tablet Take 1 tablet (25 mg total) by mouth 4 (four) times daily.    carbidopa-levodopa  mg (SINEMET CR)  mg TbSR Take 2 tablets by mouth 3 (three) times daily.    citalopram (CELEXA) 10 MG tablet Take 1 tablet (10 mg total) by mouth once daily.    cyclobenzaprine (FLEXERIL) 5 MG tablet TAKE 1 TABLET BY MOUTH 3 TIMES A DAY AS NEEDED FOR MUSCLE SPASMS.    diazePAM (VALIUM) 2 MG tablet Take 0.5-1 tablets (1-2 mg total) by mouth every 6 (six) hours as needed for Anxiety.    ESTRING 2 mg vaginal ring INSERT 1 VAGINAL RING(S) EVERY 3 MONTHS    gabapentin (NEURONTIN) 100 MG capsule Take 1-2 capsules (100-200 mg total) by mouth every evening.    ondansetron (ZOFRAN-ODT) 4 MG TbDL Take 1 tablet (4 mg total) by mouth every 6 (six) hours as needed.    temazepam (RESTORIL) 30 mg capsule TAKE 1 CAPSULE BY MOUTH EVERY NIGHT AT BEDTIME IF NEEDED           Clinical Reference Information           Your Vitals Were     BP Pulse Temp Height Weight BMI    133/72 117 98.1 °F (36.7 °C) 5' 2" (1.575 m) 52.2 kg (115 lb 1.3 oz) 21.05 kg/m2      Blood Pressure          Most Recent Value    BP  133/72      Allergies as of 5/17/2017     Silver Nitrate    Demerol [Meperidine]    Reglan [Metoclopramide Hcl]    Risperdal [Risperidone]      Immunizations Administered on Date of Encounter - 5/17/2017     None      Orders Placed During Today's Visit      Normal Orders This Visit    Ambulatory referral to Obstetrics / Gynecology     Future Labs/Procedures Expected by Expires    Mammo Digital Screening Bilateral With CAD  5/17/2017 7/17/2018      Language Assistance Services     ATTENTION: Language assistance services are available, free of charge. Please call [phone].      ATENCIÓN: Si habla desiree, tiene a camara disposición servicios gratuitos de asistencia lingüística. Llame al [phone].     CHÚ Ý: N?u b?n nói Ti?ng Vi?t, có các d?ch v? h? tr? ngôn ng? mi?n phí dành cho b?n. G?i s? " [phone].         [facility] complies with applicable Federal civil rights laws and does not discriminate on the basis of race, color, national origin, age, disability, or sex.

## 2017-05-17 NOTE — PROGRESS NOTES
Subjective:       Patient ID: Awilda Segovia is a 62 y.o. female.    Chief Complaint: estrogen ring  Pt in today for estrogen ring placement. Pt states that she has been having it inserted by her gyn, however her gyn has retired. She has been having it placed every 3 m; denies any side effects or problems with the apparatus. Mammogram due; ordered. Pt also states would like to establish with another gyn.  Past Medical History:   Diagnosis Date    H/O hysterectomy for benign disease     Menopause present     Osteopenia     Parkinson disease 12/2015    Sprain of left foot     2008     Social History     Social History    Marital status:      Spouse name: N/A    Number of children: N/A    Years of education: N/A     Occupational History         Social History Main Topics    Smoking status: Never Smoker    Smokeless tobacco: Never Used    Alcohol use 0.6 oz/week     1 Glasses of wine per week      Comment: social    Drug use: No    Sexual activity: Yes     Partners: Male     Social History Narrative    Works for [employer]     History reviewed. No pertinent surgical history.    HPI  Review of Systems   Constitutional: Negative.    HENT: Negative.    Eyes: Negative.    Respiratory: Negative.    Cardiovascular: Negative.    Gastrointestinal: Negative.    Endocrine: Negative.    Genitourinary: Negative.    Musculoskeletal: Negative.    Skin: Negative.    Allergic/Immunologic: Negative.    Neurological: Negative.    Psychiatric/Behavioral: Negative.        Objective:      Physical Exam   Constitutional: She is oriented to person, place, and time. She appears well-developed and well-nourished.   HENT:   Head: Normocephalic.   Right Ear: External ear normal.   Left Ear: External ear normal.   Nose: Nose normal.   Mouth/Throat: Oropharynx is clear and moist.   Eyes: Conjunctivae are normal. Pupils are equal, round, and reactive to light.   Neck: Normal range of motion. Neck supple.   Cardiovascular: Regular  rhythm and normal heart sounds.    Pulmonary/Chest: Effort normal and breath sounds normal.   Abdominal: Soft. Bowel sounds are normal. Hernia confirmed negative in the right inguinal area and confirmed negative in the left inguinal area.   Genitourinary: Rectum normal and vagina normal. Pelvic exam was performed with patient supine. No labial fusion. There is no rash, tenderness, lesion or injury on the right labia. There is no rash, tenderness, lesion or injury on the left labia.   Genitourinary Comments: Old estrogen ring removed; new estrogen ring placed without difficulty; tolerated well   Lymphadenopathy:        Right: No inguinal adenopathy present.        Left: No inguinal adenopathy present.   Neurological: She is alert and oriented to person, place, and time.   Skin: Skin is warm and dry.   Psychiatric: She has a normal mood and affect. Her behavior is normal. Judgment and thought content normal.   Nursing note and vitals reviewed.      Assessment:       1. Well woman exam with routine gynecological exam    2. Screening mammogram, encounter for    3. Estrogen deficiency    4.      Menopause   Plan:           Awilda was seen today for estrogen ring.    Diagnoses and all orders for this visit:  Estrogen deficiency  Menopause  Well woman exam with routine gynecological exam  Comments:  Estrogen ring  Orders:  -     Ambulatory referral to Obstetrics / Gynecology    Screening mammogram, encounter for  -     Mammo Digital Screening Bilateral With CAD; Future

## 2017-05-25 ENCOUNTER — OFFICE VISIT (OUTPATIENT)
Dept: NEUROLOGY | Facility: CLINIC | Age: 62
End: 2017-05-25
Payer: COMMERCIAL

## 2017-05-25 VITALS
HEART RATE: 114 BPM | BODY MASS INDEX: 21.54 KG/M2 | HEIGHT: 61 IN | WEIGHT: 114.06 LBS | SYSTOLIC BLOOD PRESSURE: 118 MMHG | DIASTOLIC BLOOD PRESSURE: 75 MMHG

## 2017-05-25 DIAGNOSIS — G25.81 RLS (RESTLESS LEGS SYNDROME): ICD-10-CM

## 2017-05-25 DIAGNOSIS — R63.4 WEIGHT LOSS, ABNORMAL: ICD-10-CM

## 2017-05-25 DIAGNOSIS — G20.C PARKINSONISM, UNSPECIFIED PARKINSONISM TYPE: ICD-10-CM

## 2017-05-25 PROCEDURE — 99999 PR PBB SHADOW E&M-EST. PATIENT-LVL III: CPT | Mod: PBBFAC,,, | Performed by: PSYCHIATRY & NEUROLOGY

## 2017-05-25 PROCEDURE — 99214 OFFICE O/P EST MOD 30 MIN: CPT | Mod: S$GLB,,, | Performed by: PSYCHIATRY & NEUROLOGY

## 2017-05-25 RX ORDER — PRAMIPEXOLE DIHYDROCHLORIDE 0.12 MG/1
0.12 TABLET ORAL NIGHTLY
Qty: 30 TABLET | Refills: 11 | Status: SHIPPED | OUTPATIENT
Start: 2017-05-25 | End: 2017-08-07 | Stop reason: SINTOL

## 2017-05-25 NOTE — ASSESSMENT & PLAN NOTE
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  MRI scan reviewed and there may be slight atrophy of frontal lobe, midbrain and cerebellum on my review, since 2 years ago, but this is soft-call.  What is more obvious is her exam where right side remains much more affected than left and her speech continues to lose prosody.   -> continue current dosing of sinemet, though I advised that she stop taking it one weekend for 3-4 days to see if it is cause of her nausea.  We may need to weigh risk of nausea/weight loss with any beneficial effects.

## 2017-05-25 NOTE — PROGRESS NOTES
"Awilda CONSTANTINO Chief Complaints during this visit:  f/u Patient visit for  Parkinsonism      Referring Physician:     No referring provider defined for this encounter.       Primary Care Physician:  Gato Ivan MD  [id number] [facility] [id number]      History of present illness:   62 y.o.  W seen in f/u for Parkinsonism.  Accompanied by .  Very "ansy" in the afternoons/ evenings and says her legs are "ansy."  Still easy lability.    Double dose of sinemet at noon did not help.      Interval history 1/31/17:  Accompanied by a friend.  Has been seeing Karina in the interim who has been wondering if patient may have MSA.  One fall last month when tripping at top of stairs.  Lability has improved with celexa.    Sinemet CR + carbidopa has reduced nausea and she is tolerating.  However not sure the 4th dose of sinemet has had any effect.    Voice has been changing in last couple of weeks.  No trouble with swallowing.    Home orthostatics:  1/29:  130/70, 96 laying down, 122/66, 94 standing  1/30:  126/70, 92 laying down, 110/70, 100 standing    Interval history 5/26/16:  Complains of severe spasm mid back every afternoon into the evening for past couple months.   says she is tender on spinous process, but unable to detect a spasming muscle.  Finally goes away when her restoril takes effect.  Mobic helps a little.  Wakes up intermittently at night, 1-3am, wide awake.  Restless and difficulty getting comfortable in the evenings.  Says feels like "locusts" all over her.    Right hand ring finger as well as foot tingling and numb.  Never started the neudexta because of potential interaction with selegiline.  Despite this, she has done better with tears.    From my note 2/12/16:  Annoyed by a restless feeling in her right foot that worsens as she prepares to bed.  Also having severe insomnia.  Right hand is still swollen and had poor dexterity, and right foot rolling in, but no pain in " "right side any longer.  Cries easily, whether sad or happy.  She denies feeling depressed and is resistant to going back on prozac.    From my note 12/2/15:  ... seen in consultation at the request of  Dr. Segovia () for evaluation of joint pains and fatigue.  Symptoms started as joint pain, swelling in right hand about 6-8 months ago.  When typing, letters skipped with right hand.  Otherwise feels "fine."  Right arm, hand, leg, foot still have pain, intermittently.  Yoga feels good.  Seen by Kelley and given presumptive diagnosis of PD.  Had control of depression with wellbutrin and prozac, now off in past 3 weeks.  Recent active dreaming (screaming, laughing, kicks).  Not falling asleep any more.  No problems with voice.       II.  Review of systems:  As in HPI, otherwise, balance 3 systems reviewed and are negative.    III.  Past Medical History:   Diagnosis Date    H/O hysterectomy for benign disease     Menopause present     Osteopenia     Parkinson disease 12/2015    Sprain of left foot     2008     Family History   Problem Relation Age of Onset    Diabetes Mother     Cataracts Mother     Hypertension Mother     Cancer Maternal Aunt      breast    Cancer Maternal Uncle      brain , bone     Cancer Paternal Aunt      uterine    Diabetes Maternal Grandfather     Cataracts Father      Social History     Social History    Marital status:      Spouse name: N/A    Number of children: N/A    Years of education: N/A     Social History Main Topics    Smoking status: Never Smoker    Smokeless tobacco: Never Used    Alcohol use 0.6 oz/week     1 Glasses of wine per week      Comment: social    Drug use: No    Sexual activity: Yes     Partners: Male     Other Topics Concern    None     Social History Narrative    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      carbidopa (LODOSYN) 25 mg tablet Take 1 tablet (25 mg total) by mouth 4 (four) times daily. 120 tablet 11    carbidopa-levodopa  mg (SINEMET CR)  mg TbSR Take 2 tablets by mouth 3 (three) times daily. (Patient taking differently: Take 1 tablet by mouth 4 (four) times daily. ) 540 tablet 3    citalopram (CELEXA) 10 MG tablet Take 1 tablet (10 mg total) by mouth once daily. 30 tablet 11    cyclobenzaprine (FLEXERIL) 5 MG tablet TAKE 1 TABLET BY MOUTH 3 TIMES A DAY AS NEEDED FOR MUSCLE SPASMS. 90 tablet 5    diazePAM (VALIUM) 2 MG tablet Take 0.5-1 tablets (1-2 mg total) by mouth every 6 (six) hours as needed for Anxiety. 120 tablet 4    ESTRING 2 mg vaginal ring INSERT 1 VAGINAL RING(S) EVERY 3 MONTHS  1    gabapentin (NEURONTIN) 100 MG capsule Take 1-2 capsules (100-200 mg total) by mouth every evening. 60 capsule 11    ondansetron (ZOFRAN-ODT) 4 MG TbDL Take 1 tablet (4 mg total) by mouth every 6 (six) hours as needed. 30 tablet 3     No current facility-administered medications for this visit.       PRIOR NEURO MEDICATIONS TRIED:  na    Review of patient's allergies indicates:   Allergen Reactions    Silver nitrate Anaphylaxis     Put wholes skin     Demerol [meperidine]      Can worsen PD    Reglan [metoclopramide hcl]      Can worsen PD    Risperdal [risperidone]      Can worsen PD         IV. Physical Exam (Includes Motor Unified Parkinson's Disease Rating Scale 2008)  Patient taking PD meds?         Vitals:    05/25/17 1129 05/25/17 1130   BP: 122/76 118/75   BP Location: Left arm Left arm   Patient Position: Sitting Standing   BP Method: Automatic Automatic   Pulse: 106 (!) 114   Weight: 51.8 kg (114 lb 1.4 oz)    Height: 5' 1" (1.549 m)        Wt Readings from Last 10 Encounters:   05/25/17 51.8 kg (114 lb 1.4 oz)   05/17/17 52.2 kg (115 lb 1.3 oz)   03/01/17 53.5 kg (118 lb)   01/31/17 54.7 kg (120 lb 9.5 oz)   01/04/17 " 54.3 kg (119 lb 11.4 oz)   11/30/16 53.3 kg (117 lb 8.1 oz)   10/26/16 54.1 kg (119 lb 4.3 oz)   09/22/16 55 kg (121 lb 4.1 oz)   08/18/16 55.6 kg (122 lb 9.2 oz)   06/17/16 60.7 kg (133 lb 12.8 oz)      General appearance: Well nourished, well developed, no acute distress       -------------------------------------------------------------  Affect: full       Orientation to time & place:  Oriented to time, place, person and situation       Attention & concentration:  Normal attention span and concentration       -------------------------------------------------------  Cranial nerves: normal visual acuity, visual fields full, optic discs not visualized, pupils equal round and reactive, extraocular movements intact,       facial sensation intact, face symmetrical, hearing intact to whisper, palate raises midline, shoulder shrug strength normal, tongue protrudes midline.        -------------------------------------------------------  Muscle Bulk: all 4 extremities normal                                                   Muscle strength:  5/5 in all 4 extremities        No pronator drift    --------------------------------------------------------------  Unified Parkinson's Disease Rating Scale (motor part only)      UPDRS Motor Examination      Speech  Loss of prosody   Facial Expression  0 - Normal.   Rigidity     Neck 0 - Absent.   Upper Extremity: Right 2 - Mild or moderate.   Upper Extremity: Left 0 - Absent.   Lower Extremity: Right 2 - Mild or moderate.   Lower Extremity: Left 0 - Absent.     Finger Taps      right 3 - Severely impaired. Frequent hesitation in initiating movements or arrests in ongoing movement.   left 0 - Normal.   Hand Movements      right 3 - Severely impaired. Frequent hesitation in initiating movements or arrests in ongoing movement.   left 0 - Normal.   Pronation/supination of Hands      right 3 - Severely impaired. Frequent hesitation in initiating movements or arrests in ongoing movement.    left 0 - Normal.     Toe Tapping    right 3 - Severely impaired. Frequent hesitation in initiating movements or arrests in ongoing movement.   left 0 - Normal.     Leg Agility      right 2 - Moderately impaired. Definite and early fatiguing. May have occasional arrests in movement.   left 0 - Normal.   Arising from Chair  0 - Normal.   Posture  0 - Normal erect.   Gait  1 - Walks slowly, may shuffle with short steps, but no festination (hastening steps) or propulsion.   Freezing of gait 0: Normal: No freezing.    Postural Stability (Response to sudden, strong posterior displacement produced by pull on shoulders while patient erect with eyes open and feet slightly apart.   1 - Retropulsion, but recovers unaided.    Body Bradykinesia and Hypokinesia (Combining slowness, hesitancy, decreased armswing, small amplitude, and poverty of movement in general)  1 - Minimal slowness, giving movement a deliberate character; could be normal for some persons. Possibly reduced amplitude.     Tremor at Rest:      Face, lips, chin 0 - Absent.    Hands:      right 0 - Absent.    left 0 - Absent.    Feet:     right 0 - Absent.    left 0 - Absent.    Constancy of REST tremor: 0: Normal: No tremor.   Postural tremor:    right 0 - Absent.    left 0 - Absent.    Kinetic tremor:    right 0 - Absent.    left 0 - Absent.    Dyskinesias present? No       Total Motor UPDRS:  22      V.  Laboratory/ Radiological Data:     MRI brain 2017:  A there is narrowing and blurring of the left and right pars compacta with diminished distinction between the substantia nigra and red nucleus bilaterally.  There is also hypointense signal present within the posterior basal ganglia bilaterally on the FLAIR pulse sequences.  These findings likely related to the provided history of Parkinson's disease        MRI brain 2015 personally reviewed and midbrain appears normal:            Lab Results   Component Value Date    TSH 1.668 06/24/2014         VI.  "    Problem List Items Addressed This Visit        1 - High    Parkinsonism    Overview     Right side effected (rigidity).  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  MRI scan reviewed and there may be slight atrophy of frontal lobe, midbrain and cerebellum on my review, since 2 years ago, but this is soft-call.  What is more obvious is her exam where right side remains much more affected than left and her speech continues to lose prosody.   -> continue current dosing of sinemet, though I advised that she stop taking it one weekend for 3-4 days to see if it is cause of her nausea.  We may need to weigh risk of nausea/weight loss with any beneficial effects.               3     RLS (restless legs syndrome)    Overview     Restlessness, "creepy crawlies"         Current Assessment & Plan     Remains uncontrolled.  An "ansy" feeling in evenings.   -> trial of mirapex   -> consider klonopin or ativan.         Relevant Medications    pramipexole (MIRAPEX) 0.125 MG tablet    Weight loss, abnormal    Overview     6/2016 134lb  5/25/17 114lb         Current Assessment & Plan     Worsening weight loss.  Possibly the natural, expected loss of one with Parkinsonism, but I suspect (as she does) that her nausea is the cause.   -> see plan with sinemet above           Other Visit Diagnoses    None.           Return in about 3 months (around 8/25/2017) for parkinsonism.   "

## 2017-05-25 NOTE — ASSESSMENT & PLAN NOTE
Worsening weight loss.  Possibly the natural, expected loss of one with Parkinsonism, but I suspect (as she does) that her nausea is the cause.   -> see plan with sinemet above

## 2017-05-29 ENCOUNTER — OFFICE VISIT (OUTPATIENT)
Dept: FAMILY MEDICINE | Facility: CLINIC | Age: 62
End: 2017-05-29
Payer: COMMERCIAL

## 2017-05-29 VITALS
SYSTOLIC BLOOD PRESSURE: 136 MMHG | HEART RATE: 117 BPM | WEIGHT: 114 LBS | DIASTOLIC BLOOD PRESSURE: 80 MMHG | BODY MASS INDEX: 21.52 KG/M2 | HEIGHT: 61 IN | TEMPERATURE: 99 F

## 2017-05-29 DIAGNOSIS — H61.23 IMPACTED CERUMEN, BILATERAL: ICD-10-CM

## 2017-05-29 DIAGNOSIS — H93.8X3 EAR FULLNESS, BILATERAL: Primary | ICD-10-CM

## 2017-05-29 PROCEDURE — 99999 PR PBB SHADOW E&M-EST. PATIENT-LVL III: CPT | Mod: PBBFAC,,, | Performed by: NURSE PRACTITIONER

## 2017-05-29 PROCEDURE — 99213 OFFICE O/P EST LOW 20 MIN: CPT | Mod: S$GLB,,, | Performed by: NURSE PRACTITIONER

## 2017-05-29 NOTE — PROGRESS NOTES
Subjective:       Patient ID: Awilda Segovia is a 62 y.o. female.    Chief Complaint: Otalgia    Ear Fullness    There is pain in both ears. This is a new problem. The current episode started 1 to 4 weeks ago. The problem occurs constantly. The problem has been unchanged. There has been no fever. The pain is mild. Pertinent negatives include no abdominal pain, coughing, diarrhea, ear discharge, headaches, hearing loss, neck pain, rash, rhinorrhea, sore throat or vomiting. She has tried nothing for the symptoms. The treatment provided no relief. There is no history of a chronic ear infection, hearing loss or a tympanostomy tube.     Past Medical History:   Diagnosis Date    H/O hysterectomy for benign disease     Menopause present     Osteopenia     Parkinson disease 12/2015    Sprain of left foot     2008     Social History     Social History    Marital status:      Spouse name: N/A    Number of children: N/A    Years of education: N/A     Occupational History         Social History Main Topics    Smoking status: Never Smoker    Smokeless tobacco: Never Used    Alcohol use 0.6 oz/week     1 Glasses of wine per week      Comment: social    Drug use: No    Sexual activity: Yes     Partners: Male     Social History Narrative    Works for [employer]     History reviewed. No pertinent surgical history.    Review of Systems   Constitutional: Negative.    HENT: Negative.  Negative for ear discharge, hearing loss, rhinorrhea and sore throat.    Eyes: Negative.    Respiratory: Negative.  Negative for cough.    Cardiovascular: Negative.    Gastrointestinal: Negative.  Negative for abdominal pain, diarrhea and vomiting.   Endocrine: Negative.    Genitourinary: Negative.    Musculoskeletal: Negative.  Negative for neck pain.   Skin: Negative.  Negative for rash.   Allergic/Immunologic: Negative.    Neurological: Negative.  Negative for headaches.   Psychiatric/Behavioral: Negative.        Objective:       Physical Exam   Constitutional: She is oriented to person, place, and time. She appears well-developed and well-nourished.   HENT:   Head: Normocephalic.   Right Ear: Hearing, tympanic membrane, external ear and ear canal normal.   Left Ear: Hearing, tympanic membrane, external ear and ear canal normal.   Nose: Nose normal.   Mouth/Throat: Oropharynx is clear and moist.   Impacted cerumen bilaterally prior to ear wash   Eyes: Conjunctivae are normal. Pupils are equal, round, and reactive to light.   Neck: Normal range of motion. Neck supple.   Cardiovascular: Normal rate, regular rhythm and normal heart sounds.    Pulmonary/Chest: Effort normal and breath sounds normal.   Abdominal: Soft. Bowel sounds are normal.   Musculoskeletal: Normal range of motion.   Neurological: She is alert and oriented to person, place, and time.   Skin: Skin is warm and dry. Capillary refill takes 2 to 3 seconds.   Psychiatric: She has a normal mood and affect. Her behavior is normal. Judgment and thought content normal.   Nursing note and vitals reviewed.      Assessment:       1. Ear fullness, bilateral    2. Impacted cerumen, bilateral        Plan:           Awilda was seen today for otalgia.    Diagnoses and all orders for this visit:    Ear fullness, bilateral  Impacted cerumen, bilateral  -     Ear wax removal  -     carbamide peroxide 6.5 % otic solution 5 drop; Place 5 drops into both ears one time.

## 2017-05-31 RX ORDER — LEVOCETIRIZINE DIHYDROCHLORIDE 5 MG/1
5 TABLET, FILM COATED ORAL NIGHTLY
Qty: 30 TABLET | Refills: 11 | Status: SHIPPED | OUTPATIENT
Start: 2017-05-31 | End: 2017-12-27 | Stop reason: SDUPTHER

## 2017-06-05 RX ORDER — TEMAZEPAM 30 MG/1
30 CAPSULE ORAL NIGHTLY PRN
Qty: 30 CAPSULE | Refills: 5 | Status: SHIPPED | OUTPATIENT
Start: 2017-06-05 | End: 2017-07-04 | Stop reason: SDUPTHER

## 2017-06-05 NOTE — TELEPHONE ENCOUNTER
----- Message from Erin Reyes sent at 6/5/2017  2:59 PM CDT -----  Contact: Pt  Pt is requesting to have a refill on Temazepam 30mg. Pt is out of medication. Pt uses.    [facility]/pharmacy #[id number] - [place], [place] - [address] [address]  Phone: [phone] Fax: [phone]    Pt can be reached at [phone].

## 2017-06-25 RX ORDER — GABAPENTIN 100 MG/1
CAPSULE ORAL
Qty: 60 CAPSULE | Refills: 11 | Status: SHIPPED | OUTPATIENT
Start: 2017-06-25 | End: 2017-12-11

## 2017-06-27 ENCOUNTER — TELEPHONE (OUTPATIENT)
Dept: NEUROLOGY | Facility: CLINIC | Age: 62
End: 2017-06-27

## 2017-06-27 DIAGNOSIS — G20.C PARKINSONISM, UNSPECIFIED PARKINSONISM TYPE: ICD-10-CM

## 2017-06-27 RX ORDER — CARBIDOPA AND LEVODOPA 25; 100 MG/1; MG/1
1 TABLET ORAL 3 TIMES DAILY
Qty: 270 TABLET | Refills: 3 | Status: SHIPPED | OUTPATIENT
Start: 2017-06-27 | End: 2017-06-28 | Stop reason: SDUPTHER

## 2017-06-28 RX ORDER — CARBIDOPA AND LEVODOPA 25; 100 MG/1; MG/1
TABLET ORAL
Qty: 360 TABLET | Refills: 3 | Status: SHIPPED | OUTPATIENT
Start: 2017-06-28 | End: 2017-08-07 | Stop reason: SDUPTHER

## 2017-06-28 RX ORDER — CARBIDOPA AND LEVODOPA 25; 100 MG/1; MG/1
TABLET, EXTENDED RELEASE ORAL
Qty: 540 TABLET | Refills: 3 | Status: SHIPPED | OUTPATIENT
Start: 2017-06-28 | End: 2017-12-27 | Stop reason: SDUPTHER

## 2017-06-28 NOTE — TELEPHONE ENCOUNTER
----- Message from Katty Toney sent at 6/28/2017  3:11 PM CDT -----  Question on Rx Carbidopa- Levodopa.  Please call [facility] at [phone].

## 2017-06-28 NOTE — TELEPHONE ENCOUNTER
Returned call to pharmacy and they would like to know if patient should be on CR or regular does of CD/LD. Would like to verify with new medication sent in on yesterday.

## 2017-06-28 NOTE — TELEPHONE ENCOUNTER
HUH?  The refill yesterday was for gabapentin...      From ondina's note 3/1/17:   2 cd/ld CR + cd at 7 AM and 1 cd/ld CR + cd (at noon, 6P and 9:30P    Refilled the cr and reg as above.

## 2017-07-04 RX ORDER — TEMAZEPAM 30 MG/1
30 CAPSULE ORAL NIGHTLY PRN
Qty: 30 CAPSULE | Refills: 5 | Status: SHIPPED | OUTPATIENT
Start: 2017-07-04 | End: 2017-08-03

## 2017-07-26 ENCOUNTER — TELEPHONE (OUTPATIENT)
Dept: FAMILY MEDICINE | Facility: CLINIC | Age: 62
End: 2017-07-26

## 2017-07-26 NOTE — TELEPHONE ENCOUNTER
She is having a workup with a Dr. Hagen and he would like for her to have a CHUY scan.  I cannot find that in the system on how to order it.  Can you help me understand what it is he is wanting and how should I place the order.

## 2017-08-07 ENCOUNTER — OFFICE VISIT (OUTPATIENT)
Dept: NEUROLOGY | Facility: CLINIC | Age: 62
End: 2017-08-07
Payer: COMMERCIAL

## 2017-08-07 VITALS
SYSTOLIC BLOOD PRESSURE: 135 MMHG | DIASTOLIC BLOOD PRESSURE: 79 MMHG | BODY MASS INDEX: 20.87 KG/M2 | WEIGHT: 110.56 LBS | RESPIRATION RATE: 20 BRPM | HEIGHT: 61 IN | HEART RATE: 123 BPM

## 2017-08-07 DIAGNOSIS — G20.C PARKINSONISM, UNSPECIFIED PARKINSONISM TYPE: Primary | ICD-10-CM

## 2017-08-07 DIAGNOSIS — R63.4 WEIGHT LOSS, ABNORMAL: ICD-10-CM

## 2017-08-07 DIAGNOSIS — G25.81 RLS (RESTLESS LEGS SYNDROME): ICD-10-CM

## 2017-08-07 DIAGNOSIS — F48.2 PBA (PSEUDOBULBAR AFFECT): ICD-10-CM

## 2017-08-07 PROCEDURE — 99214 OFFICE O/P EST MOD 30 MIN: CPT | Mod: S$GLB,,, | Performed by: NURSE PRACTITIONER

## 2017-08-07 PROCEDURE — 3008F BODY MASS INDEX DOCD: CPT | Mod: S$GLB,,, | Performed by: NURSE PRACTITIONER

## 2017-08-07 PROCEDURE — 99999 PR PBB SHADOW E&M-EST. PATIENT-LVL III: CPT | Mod: PBBFAC,,, | Performed by: NURSE PRACTITIONER

## 2017-08-07 NOTE — PROGRESS NOTES
"Name: Awilda Segovia  MRN: [id number]   CSN: [id number]      Date: 08/07/2017    Referring physician:  No referring provider defined for this encounter.    Chief Complaint / Interval History: Tremors; Dizziness; Weight Loss; and cries alot      History of Present Illness (HPI):    63 yo female with parkinsonism, last seen in the office in May by Dr. Garcia, accompanied today by her friend, Adina. She has lost more weight. Down to 110 lbs.     She descries constant dizziness, even with sitting. This intensifies when she turns head. It increases a little when she goes to stand but says she is not sure how much more fluid she can drink.   When eating, she can sit for a meal as her legs fell so ansy. She will take a nite, walk, take a bite and walk.    Prescibed med for RLS- stopped immediately. Had hallucination and stopped.  Fallen 2 times since last saw me.   Says she has lost more of her right side. R shoulder hurts. Sleeps with ice pack. Does not bother her during the day.    Feels stiff throughout R side. Feels like she has lost 75% of her R side.   Feels slow.   Voice notes more mumbling.   Has not been able to go to [facility] because of the dizziness.     Sister in law committed suicide last week which has been devastating.   They got the news when they were vacationing in [place]. They were gone one week and it wore her out. She had to be in a WC the whole time.     From Dr. Garcia 5-25-17...  History of present illness:   62 y.o.  W seen in f/u for Parkinsonism.  Accompanied by .  Very "ansy" in the afternoons/ evenings and says her legs are "ansy."  Still easy lability.     Double dose of sinemet at noon did not help.        1 - High      Parkinsonism     Overview       Right side effected (rigidity).  Probable CBD         Current Assessment & Plan       PD, right rigid-type vs CBD, with worsening right-sided apraxia.  MRI scan reviewed and there may be slight atrophy of frontal lobe, midbrain and cerebellum on " "my review, since 2 years ago, but this is soft-call.  What is more obvious is her exam where right side remains much more affected than left and her speech continues to lose prosody.                        -> continue current dosing of sinemet, though I advised that she stop taking it one weekend for 3-4 days to see if it is cause of her nausea.  We may need to weigh risk of nausea/weight loss with any beneficial effects.                                      3      RLS (restless legs syndrome)     Overview       Restlessness, "creepy crawlies"         Current Assessment & Plan       Remains uncontrolled.  An "ansy" feeling in evenings.                        -> trial of mirapex                        -> consider klonopin or ativan.         Relevant Medications     pramipexole (MIRAPEX) 0.125 MG tablet     Weight loss, abnormal     Overview       6/2016 134lb  5/25/17 114lb         Current Assessment & Plan       Worsening weight loss.  Possibly the natural, expected loss of one with Parkinsonism, but I suspect (as she does) that her nausea is the cause.                        -> see plan with sinemet above           Interval history 1/31/17:  Accompanied by a friend.  Has been seeing Karina in the interim who has been wondering if patient may have MSA.  One fall last month when tripping at top of stairs.  Lability has improved with celexa.     Sinemet CR + carbidopa has reduced nausea and she is tolerating.  However not sure the 4th dose of sinemet has had any effect.     Voice has been changing in last couple of weeks.  No trouble with swallowing.     Home orthostatics:  1/29:  130/70, 96 laying down, 122/66, 94 standing  1/30:  126/70, 92 laying down, 110/70, 100 standing     Interval history 5/26/16:  Complains of severe spasm mid back every afternoon into the evening for past couple months.   says she is tender on spinous process, but unable to detect a spasming muscle.  Finally goes away when her " "restoril takes effect.  Mobic helps a little.  Wakes up intermittently at night, 1-3am, wide awake.  Restless and difficulty getting comfortable in the evenings.  Says feels like "locusts" all over her.    Right hand ring finger as well as foot tingling and numb.  Never started the neudexta because of potential interaction with selegiline.  Despite this, she has done better with tears.     From my note 2/12/16:  Annoyed by a restless feeling in her right foot that worsens as she prepares to bed.  Also having severe insomnia.  Right hand is still swollen and had poor dexterity, and right foot rolling in, but no pain in right side any longer.  Cries easily, whether sad or happy.  She denies feeling depressed and is resistant to going back on prozac.     From my note 12/2/15:  ... seen in consultation at the request of  Dr. Segovia () for evaluation of joint pains and fatigue.  Symptoms started as joint pain, swelling in right hand about 6-8 months ago.  When typing, letters skipped with right hand.  Otherwise feels "fine."  Right arm, hand, leg, foot still have pain, intermittently.  Yoga feels good.  Seen by Kelley and given presumptive diagnosis of PD.  Had control of depression with wellbutrin and prozac, now off in past 3 weeks.  Recent active dreaming (screaming, laughing, kicks).  Not falling asleep any more.  No problems with voice.      Nonmotor/Premotor ROS:  Dysphagia (HENT)?No  RBD/sleep issues (Constitutional)?No  Depression/anxiety (Psychiatric)?Yes  Fatigue (Constitutional)?Yes  Constipation (GI)?Yes- takes Miralax qod- helps  Urinary issues ()?No  Dizziness (Cardiovascular)?Yes- denies syncope  Falls (Musculoskeletal)?Yes- can fall forwards and backwards  Psychoses (Psychiatric)?Yes- once with MPX, stopped and has not happened since  Pain/Paresthesia (Neurologic)?Yes- R shoulder pain at night- describes paresthesias entire R side  Visual changes (Eyes)?Yes- dilpopia  Stridor/SOB (Pulm)?Yes- no SOA " but states has made odd sounds at night but not during the day      Past Medical History: The patient  has a past medical history of H/O hysterectomy for benign disease; Menopause present; Osteopenia; Parkinson disease (12/2015); and Sprain of left foot.    Social History: The patient  reports that she has never smoked. She has never used smokeless tobacco. She reports that she drinks about 0.6 oz of alcohol per week . She reports that she does not use drugs.    Family History: Their family history includes Cancer in her maternal aunt, maternal uncle, and paternal aunt; Cataracts in her father and mother; Diabetes in her maternal grandfather and mother; Hypertension in her mother.    Allergies: Silver nitrate; Demerol [meperidine]; Reglan [metoclopramide hcl]; and Risperdal [risperidone]     Meds:   Current Outpatient Prescriptions on File Prior to Visit   Medication Sig Dispense Refill    calcium-vitamin D3 (CALCIUM 500 + D) 500 mg(1,250mg) -200 unit per tablet Take 1 tablet by mouth 3 (three) times daily with meals.      carbidopa (LODOSYN) 25 mg tablet Take 1 tablet (25 mg total) by mouth 4 (four) times daily. 120 tablet 11    carbidopa-levodopa  mg (SINEMET CR)  mg TbSR Take 2 tablets at 7am, then 1 tab at noon, 6pm and 9:30pm 540 tablet 3    citalopram (CELEXA) 10 MG tablet Take 1 tablet (10 mg total) by mouth once daily. 30 tablet 11    cyclobenzaprine (FLEXERIL) 5 MG tablet TAKE 1 TABLET BY MOUTH 3 TIMES A DAY AS NEEDED FOR MUSCLE SPASMS. 90 tablet 5    diazePAM (VALIUM) 2 MG tablet Take 0.5-1 tablets (1-2 mg total) by mouth every 6 (six) hours as needed for Anxiety. 120 tablet 4    ESTRING 2 mg vaginal ring INSERT 1 VAGINAL RING(S) EVERY 3 MONTHS  1    gabapentin (NEURONTIN) 100 MG capsule TAKE 1-2 CAPSULES EVERY EVENING 60 capsule 11    levocetirizine (XYZAL) 5 MG tablet Take 1 tablet (5 mg total) by mouth every evening. 30 tablet 11    ondansetron (ZOFRAN-ODT) 4 MG TbDL Take 1 tablet  "(4 mg total) by mouth every 6 (six) hours as needed. 30 tablet 3    pramipexole (MIRAPEX) 0.125 MG tablet Take 1 tablet (0.125 mg total) by mouth every evening. 30 tablet 11    [DISCONTINUED] carbidopa-levodopa  mg (SINEMET)  mg per tablet Take one tab at 7am, noon, 6pm and 9:30pm (with sinemet CR) 360 tablet 3    [DISCONTINUED] venlafaxine (EFFEXOR-XR) 75 MG 24 hr capsule Take 1 capsule (75 mg total) by mouth once daily. 30 capsule 11     Current Facility-Administered Medications on File Prior to Visit   Medication Dose Route Frequency Provider Last Rate Last Dose    carbamide peroxide 6.5 % otic solution 5 drop  5 drop Both Ears 1 time in Clinic/HOD Bonita Hoyos NP           Exam:  /79   Pulse (!) 123   Resp 20   Ht 5' 1" (1.549 m)   Wt 50.1 kg (110 lb 9 oz)   BMI 20.89 kg/m²     Constitutional  Well-developed, well-nourished, appears stated age   Cardiovascular  Radial pulses 2+ and symmetric, no LE edema bilaterally   Neurological    * Mental status  MOCA = deferred     - Orientation  Oriented to person, place, time, and situation     - Memory   Intact recent and remote     - Attention/concentration  Attentive, vigilant during exam     - Language  +2-step commands     - Fund of knowledge  Aware of current events     - Executive  Well-organized thoughts   * Coordination  No dysmetria with finger-to-nose or heel-to-shin   * Gait  See below.   * Specialized movement exam  Last dose 1100- exam time 1400 Mild hypophonic speech.   Mild facial masking.   Cogwheel rigidity: mild RUE/RLE and slt LUE and LLE.    ANTONY: moderate bradykinesia RH and mild R foot, essentially normal on Left side.   Does have motor construction apraxia today with RH- more than I have seen her exhibit in previous exams.   Slt rest tremor RH- near mild R 4th digit. Subtle tremor RH with posture.     Slow to arise but can do so without push. No other dytonia, chorea, athetosis, myoclonus, or tics.   No motor " impersistence.   Narrow-based gait.   Shortened stride length.   Reduced R arm swing.   Uses cane.              Laboratory/Radiological:  - Results:  No visits with results within 3 Month(s) from this visit.   Latest known visit with results is:   Lab Visit on 01/06/2017   Component Date Value Ref Range Status    Sodium 01/06/2017 141  136 - 145 mmol/L Final    Potassium 01/06/2017 4.5  3.5 - 5.1 mmol/L Final    Chloride 01/06/2017 104  95 - 110 mmol/L Final    CO2 01/06/2017 29  23 - 29 mmol/L Final    Glucose 01/06/2017 91  70 - 110 mg/dL Final    BUN, Bld 01/06/2017 20  8 - 23 mg/dL Final    Creatinine 01/06/2017 0.8  0.5 - 1.4 mg/dL Final    Calcium 01/06/2017 9.7  8.7 - 10.5 mg/dL Final    Total Protein 01/06/2017 7.6  6.0 - 8.4 g/dL Final    Albumin 01/06/2017 4.4  3.5 - 5.2 g/dL Final    Total Bilirubin 01/06/2017 0.6  0.1 - 1.0 mg/dL Final    Alkaline Phosphatase 01/06/2017 72  55 - 135 U/L Final    AST 01/06/2017 27  10 - 40 U/L Final    ALT 01/06/2017 6* 10 - 44 U/L Final    Anion Gap 01/06/2017 8  8 - 16 mmol/L Final    eGFR if African American 01/06/2017 >60.0  >60 mL/min/1.73 m^2 Final    eGFR if non African American 01/06/2017 >60.0  >60 mL/min/1.73 m^2 Final    Cholesterol 01/06/2017 168  120 - 199 mg/dL Final    Triglycerides 01/06/2017 59  30 - 150 mg/dL Final    HDL 01/06/2017 72  40 - 75 mg/dL Final    LDL Cholesterol 01/06/2017 84.2  63.0 - 159.0 mg/dL Final    HDL/Chol Ratio 01/06/2017 42.9  20.0 - 50.0 % Final    Total Cholesterol/HDL Ratio 01/06/2017 2.3  2.0 - 5.0 Final    Non-HDL Cholesterol 01/06/2017 96  mg/dL Final    Hepatitis C Ab 01/09/2017 Negative   Final       MRI brain 2017:  A there is narrowing and blurring of the left and right pars compacta with diminished distinction between the substantia nigra and red nucleus bilaterally.  There is also hypointense signal present within the posterior basal ganglia bilaterally on the FLAIR pulse sequences.  These  findings likely related to the provided history of Parkinson's disease           MRI brain 2015:            Diagnoses:          Parkinsonism   -rigid R side, progressing   -?CBD   -unclear l-dopa response  Dizziness   -constant   -no synocpe  PBA  Weight Loss  RLS      Medical Decision Making:    Reduce carbidopa / levodopa CR 25/100 to see if it helps alleviate any symptoms or worsens any symptoms. She will reduce as follows...  Week 1-  7 AM- 1 tablet  Noon- 1 tablet  6PM- 1 tablet  9:30PM- 1 tablet    Week 2 and after-   7AM- 1 tablet   Noon- 1 tablet  6PM- take 1 tablet  She will continue carbidopa with each dose of l-dopa CR.     Her heart rate was elevated today (110 bpm when I took it). As visit went on, it was 84. Keep an eye on this.     She offers that she thinks she is ready for Hospice. I have told her I do not feel it is time. She has had a great deal of stress with death of sister-in-law. Watch depression!    She would like to return to office in 4-6 weeks. Will see if we can accommodate this on [place].     I spent 35 minutes face-to-face with the patient with >50% of the time spent with counseling and education regarding:  - results of data, diagnosis, and recommendations stated above  - the prognosis of parkinsonism  - risks and benefits of cd/ld   - importance of diet and exercise    Karina Webb, DNP, NP-C  Division of Movement and Memory Disorders  [facility]  [phone]

## 2017-08-07 NOTE — Clinical Note
Can you please get her an appt with me in [place] in 4-8 weeks on [place]?  She also needs an appt with gl in 3-4 months -either side.

## 2017-08-07 NOTE — PATIENT INSTRUCTIONS
Let's reduce carbidopa / levodopa CR 25/100 as follows...  Week 1-  7 AM- take 1 tablet  Noon- 1 tablet  6PM- 1 tablet  9:30PM- take 1 tablet    Week 2 and after-   7AM- 1 tablet   Noon- 1 tablet  6PM- take 1 tablet    With each dose of carbidopa / levodopa CR, make sure to take carbidopa with it.    Let's see if this helps improve dizziness at all.   Also, do you physically see any worsening of symptoms (tremro, stiffness, slowness).     Your heart rate was elevated today (110 bpm when I took it). As visit went on, it was 84. Keep an eye on this.

## 2017-08-16 ENCOUNTER — TELEPHONE (OUTPATIENT)
Dept: FAMILY MEDICINE | Facility: CLINIC | Age: 62
End: 2017-08-16

## 2017-08-16 DIAGNOSIS — Z91.81 HISTORY OF RECENT FALL: ICD-10-CM

## 2017-08-16 DIAGNOSIS — G20.C PARKINSONISM, UNSPECIFIED PARKINSONISM TYPE: Primary | ICD-10-CM

## 2017-08-16 NOTE — TELEPHONE ENCOUNTER
I have signed for the following orders AND/OR meds.  Please call the patient and ask the patient to schedule the testing AND/OR inform about any medications that were sent.      Orders Placed This Encounter   Procedures    Ambulatory referral to Home Health     Referral Priority:   Routine     Referral Type:   Home Health     Referral Reason:   Specialty Services Required     Referred to Provider:   [facility]     Requested Specialty:   Home Health Services     Number of Visits Requested:   1

## 2017-09-13 ENCOUNTER — OFFICE VISIT (OUTPATIENT)
Dept: NEUROLOGY | Facility: CLINIC | Age: 62
End: 2017-09-13
Payer: COMMERCIAL

## 2017-09-13 VITALS
RESPIRATION RATE: 20 BRPM | WEIGHT: 110.44 LBS | SYSTOLIC BLOOD PRESSURE: 128 MMHG | DIASTOLIC BLOOD PRESSURE: 76 MMHG | HEART RATE: 120 BPM | BODY MASS INDEX: 20.85 KG/M2 | HEIGHT: 61 IN

## 2017-09-13 DIAGNOSIS — G20.C PARKINSONISM, UNSPECIFIED PARKINSONISM TYPE: Primary | ICD-10-CM

## 2017-09-13 DIAGNOSIS — G25.81 RLS (RESTLESS LEGS SYNDROME): ICD-10-CM

## 2017-09-13 DIAGNOSIS — F48.2 PBA (PSEUDOBULBAR AFFECT): ICD-10-CM

## 2017-09-13 DIAGNOSIS — R63.4 WEIGHT LOSS, ABNORMAL: ICD-10-CM

## 2017-09-13 PROCEDURE — 99214 OFFICE O/P EST MOD 30 MIN: CPT | Mod: S$GLB,,, | Performed by: NURSE PRACTITIONER

## 2017-09-13 PROCEDURE — 3008F BODY MASS INDEX DOCD: CPT | Mod: S$GLB,,, | Performed by: NURSE PRACTITIONER

## 2017-09-13 PROCEDURE — 99999 PR PBB SHADOW E&M-EST. PATIENT-LVL III: CPT | Mod: PBBFAC,,, | Performed by: NURSE PRACTITIONER

## 2017-09-13 NOTE — PATIENT INSTRUCTIONS
Ask Dr. Hagen how he feels about Nuedexta to help with crying.     Continue your current regimen without change.     Recommend seeing Dr. Ivan about your elevated heart rate.

## 2017-09-13 NOTE — PROGRESS NOTES
"Name: Awilda Segovia  MRN: [id number]   [facility]: [id number]      Date: 9-13-17      Referring physician:  No referring provider defined for this encounter.    Subjective:      Chief Complaint: "what a difference a month makes"    History of Present Illness (HPI):    Awilda Segovia is a 62 y.o. left-handed female who presents today for a follow-up evaluation of parkinsonism and is accompanied by friend.     Last seen in office 8-7-17. At that time, discussed reduction of cd/ld CR to see if it helped alleviate any of her symptoms or if she could see that it actually provided any benefit. She also had an increased HR at that time, as is the case again today.     Since last visit, she went to a  who recommended she see another doctor. She saw Dr. Hagen.   He wanted her off CR for 3 days before seeing her. She fell 12 times, was dizzier and felt awful without it. Friend says she deteriorated so bad that she was nearly bed ridden.     By her account, Dr. Hagen felt she had PD, did not mention parkinsonism and had her begin Rytary 23.75 mg/ 95 mg 1 capsule QID and CR 25/100 QID. Doses 7-11-5-9.  She says everything is better on it. She She vomited twice when began regimen and continues with intermittent nausea.   She has not regained normal diet. She is still eating soft foods and fresh fruits. She had gotten down to 103 lbs and is now up to 105 lbs on her scale.      No falls since she started this new regimen. Uses walker now.     She knows she is definitely worse without cd/ld.     Her next appt with Dr. Hagen is this Friday.     She has HH following her with PT and OT. They have been coming twice per week. Taken HR a couple of times. Has gotten as high as 124. Does not feel SOA.     Describes diplopia past 3 weeks. New Rx glasses. This is when diplopia started. But then says it is improved with the glasses.      Mood is "good."  When asked directly about depression, states she is as depressed as last visit. Crying more. Feels " it does not fit the emotion.     Review of Systems   Constitutional: Positive for fatigue.   HENT: Negative for drooling, trouble swallowing and voice change.    Eyes: Positive for visual disturbance.   Respiratory: Negative for shortness of breath and stridor.    Cardiovascular: Negative for leg swelling.   Gastrointestinal: Positive for constipation and nausea.   Genitourinary: Negative.  Negative for frequency and urgency.   Musculoskeletal: Positive for gait problem.   Skin: Negative for rash.   Neurological: Positive for dizziness, tremors and speech difficulty. Negative for syncope.   Hematological: Does not bruise/bleed easily.   Psychiatric/Behavioral: Positive for dysphoric mood. Negative for hallucinations and sleep disturbance. The patient is nervous/anxious.        Past Medical History: The patient  has a past medical history of H/O hysterectomy for benign disease; Menopause present; Osteopenia; Parkinson disease (12/2015); and Sprain of left foot.    Social History: The patient  reports that she has never smoked. She has never used smokeless tobacco. She reports that she drinks about 0.6 oz of alcohol per week . She reports that she does not use drugs.    Family History: Their family history includes Cancer in her maternal aunt, maternal uncle, and paternal aunt; Cataracts in her father and mother; Diabetes in her maternal grandfather and mother; Hypertension in her mother.    Allergies: Silver nitrate; Demerol [meperidine]; Reglan [metoclopramide hcl]; and Risperdal [risperidone]     Meds:   Current Outpatient Prescriptions on File Prior to Visit   Medication Sig Dispense Refill    calcium-vitamin D3 (CALCIUM 500 + D) 500 mg(1,250mg) -200 unit per tablet Take 1 tablet by mouth 3 (three) times daily with meals.      carbidopa (LODOSYN) 25 mg tablet Take 1 tablet (25 mg total) by mouth 4 (four) times daily. 120 tablet 11    carbidopa-levodopa  mg (SINEMET CR)  mg TbSR Take 2 tablets at  "7am, then 1 tab at noon, 6pm and 9:30pm 540 tablet 3    citalopram (CELEXA) 10 MG tablet Take 1 tablet (10 mg total) by mouth once daily. 30 tablet 11    cyclobenzaprine (FLEXERIL) 5 MG tablet TAKE 1 TABLET BY MOUTH 3 TIMES A DAY AS NEEDED FOR MUSCLE SPASMS. 90 tablet 5    diazePAM (VALIUM) 2 MG tablet Take 0.5-1 tablets (1-2 mg total) by mouth every 6 (six) hours as needed for Anxiety. 120 tablet 4    ESTRING 2 mg vaginal ring INSERT 1 VAGINAL RING(S) EVERY 3 MONTHS  1    gabapentin (NEURONTIN) 100 MG capsule TAKE 1-2 CAPSULES EVERY EVENING 60 capsule 11    levocetirizine (XYZAL) 5 MG tablet Take 1 tablet (5 mg total) by mouth every evening. 30 tablet 11    [DISCONTINUED] ondansetron (ZOFRAN-ODT) 4 MG TbDL Take 1 tablet (4 mg total) by mouth every 6 (six) hours as needed. 30 tablet 3    [DISCONTINUED] venlafaxine (EFFEXOR-XR) 75 MG 24 hr capsule Take 1 capsule (75 mg total) by mouth once daily. 30 capsule 11     Current Facility-Administered Medications on File Prior to Visit   Medication Dose Route Frequency Provider Last Rate Last Dose    [DISCONTINUED] carbamide peroxide 6.5 % otic solution 5 drop  5 drop Both Ears 1 time in Clinic/HOD Bonita Hoyos NP           Objective:     Physical Exam:    Vitals:    09/13/17 1146   BP: 128/76   BP Location: Left arm   Patient Position: Sitting   BP Method: Medium (Automatic)   Pulse: (!) 120   Resp: 20   Weight: 50.1 kg (110 lb 7.2 oz)   Height: 5' 1" (1.549 m)     Body mass index is 20.87 kg/m².     HR - taken again radially during visit 96.     Constitutional  Well-developed, well-nourished, appears stated age   Cardiovascular  Radial pulses 2+ and symmetric, no LE edema bilaterally       ..III.  MOTOR EXAMINATION - last dose l-dopa 0700- exam 1240- due now       Speech  2 - Monotone, slurred but understandable; moderately impaired.   Facial Expression  2 - Slight but definitely abnormal diminution of facial expression.    Tremor at Rest:      Face, " lips, chin 0 - Absent.    Hands:      right 0 - Absent.    left 0 - Absent.    Feet:      right 0 - Absent.    left 0 - Absent.    Action or Postural Tremor of Hands      right 0 - Absent.    left 0 - Absent.    Rigidity          Upper Extremity: Right 2 - Mild or moderate.   Upper Extremity: Left 0 - Absent.   Lower Extremity: Right 2 - Mild or moderate.   Lower Extremity: Left 0 - Absent.   Finger Taps      right 4 - Can barely perform the task.    left 1 - Mild slowing and/or reduction in amplitude.   Hand Movements      right 3 - Severely impaired. Frequent hesitation in initiating movements or arrests in ongoing movement.   left 1 - Mild slowing and/or reduction in amplitude.   Rapid Alternating Movements of Hands      right 3 - Severely impaired. Frequent hesitation in initiating movements or arrests in ongoing movement.   left 1 - Mild slowing and/or reduction in amplitude.   Leg Agility      right 3 - Severely impaired. Frequent hesitation in initiating movements or arrests in ongoing movement.   left 1 - Mild slowing and/or reduction in amplitude.   Arising from Chair  2 - Pushes self up from arms of seat.   Posture  0 - Normal erect.   Gait  3 - severe disturbance of gait.   Postural Stability (Response to sudden, strong posterior displacement produced by pull on shoulders while patient erect with eyes open and feet slightly apart. Patient is prepared, and can have had some practice runs.)  deferred   Body Bradykinesia and Hypokinesia (Combining slowness, hesitancy, decreased armswing, small amplitude, and poverty of movement in general)  3 - Moderate slowness, poverty or small amplitude of movement.           Laboratory Results:  No visits with results within 3 Month(s) from this visit.   Latest known visit with results is:   Lab Visit on 01/06/2017   Component Date Value Ref Range Status    Sodium 01/06/2017 141  136 - 145 mmol/L Final    Potassium 01/06/2017 4.5  3.5 - 5.1 mmol/L Final    Chloride  01/06/2017 104  95 - 110 mmol/L Final    CO2 01/06/2017 29  23 - 29 mmol/L Final    Glucose 01/06/2017 91  70 - 110 mg/dL Final    BUN, Bld 01/06/2017 20  8 - 23 mg/dL Final    Creatinine 01/06/2017 0.8  0.5 - 1.4 mg/dL Final    Calcium 01/06/2017 9.7  8.7 - 10.5 mg/dL Final    Total Protein 01/06/2017 7.6  6.0 - 8.4 g/dL Final    Albumin 01/06/2017 4.4  3.5 - 5.2 g/dL Final    Total Bilirubin 01/06/2017 0.6  0.1 - 1.0 mg/dL Final    Alkaline Phosphatase 01/06/2017 72  55 - 135 U/L Final    AST 01/06/2017 27  10 - 40 U/L Final    ALT 01/06/2017 6* 10 - 44 U/L Final    Anion Gap 01/06/2017 8  8 - 16 mmol/L Final    eGFR if African American 01/06/2017 >60.0  >60 mL/min/1.73 m^2 Final    eGFR if non African American 01/06/2017 >60.0  >60 mL/min/1.73 m^2 Final    Cholesterol 01/06/2017 168  120 - 199 mg/dL Final    Triglycerides 01/06/2017 59  30 - 150 mg/dL Final    HDL 01/06/2017 72  40 - 75 mg/dL Final    LDL Cholesterol 01/06/2017 84.2  63.0 - 159.0 mg/dL Final    HDL/Chol Ratio 01/06/2017 42.9  20.0 - 50.0 % Final    Total Cholesterol/HDL Ratio 01/06/2017 2.3  2.0 - 5.0 Final    Non-HDL Cholesterol 01/06/2017 96  mg/dL Final    Hepatitis C Ab 01/09/2017 Negative   Final       Imaging:     MRI brain 2017:  A there is narrowing and blurring of the left and right pars compacta with diminished distinction between the substantia nigra and red nucleus bilaterally.  There is also hypointense signal present within the posterior basal ganglia bilaterally on the FLAIR pulse sequences.  These findings likely related to the provided history of Parkinson's disease           MRI brain 2015:            Assessment and Plan     Parkinsonism, unspecified Parkinsonism type    PBA (pseudobulbar affect)    Weight loss, abnormal    RLS (restless legs syndrome)        Medical Decision Making:    Parkinsonism, R sided and progressing. Since last visit, it is now clear to her that she definitely feels better on  l-dopa. Since last visit, she has established care with Dr. Hagen and he prescribed Rytary. She is doing with well with this. She has an appt with him this Friday. No adjustments made today. She expresses that she would like to remain under his care as well as ours, as we are easier to get to than Dr. Hagen. From a prescribing standpoint, I have reservations about her seeing both of us, especially in such close proximity of visits.     She has questions about iPD vs parkinsonisms. Discussed with her.     I have asked her to talk to Dr. Hagen about Nuedexta.        I have asked her to see her PCP about her HR. It is elevated again today. She is not in any distress.     Recommend seeing a nutritionist.     She has follow up appt with Dr. Garcia scheduled for November.     I spent 35 minutes face-to-face with the patient and friend with >50% of the time spent with counseling and education regarding:  - results of data, diagnosis, and recommendations stated above  - the prognosis of parkinsonism  - risks and benefits of l-dopa  - importance of diet and exercise    Karina Webb, ASTON, NP-C  Division of Movement and Memory Disorders  [facility]  [phone]

## 2017-09-24 RX ORDER — CYCLOBENZAPRINE HCL 5 MG
TABLET ORAL
Qty: 90 TABLET | Refills: 5 | Status: SHIPPED | OUTPATIENT
Start: 2017-09-24 | End: 2017-12-27

## 2017-09-26 ENCOUNTER — OFFICE VISIT (OUTPATIENT)
Dept: FAMILY MEDICINE | Facility: CLINIC | Age: 62
End: 2017-09-26
Payer: COMMERCIAL

## 2017-09-26 ENCOUNTER — PATIENT MESSAGE (OUTPATIENT)
Dept: FAMILY MEDICINE | Facility: CLINIC | Age: 62
End: 2017-09-26

## 2017-09-26 ENCOUNTER — HOSPITAL ENCOUNTER (OUTPATIENT)
Dept: RADIOLOGY | Facility: HOSPITAL | Age: 62
Discharge: HOME | End: 2017-09-26
Attending: NURSE PRACTITIONER
Payer: COMMERCIAL

## 2017-09-26 VITALS
SYSTOLIC BLOOD PRESSURE: 120 MMHG | BODY MASS INDEX: 19.9 KG/M2 | HEART RATE: 117 BPM | DIASTOLIC BLOOD PRESSURE: 74 MMHG | HEIGHT: 61 IN | WEIGHT: 105.38 LBS

## 2017-09-26 DIAGNOSIS — R33.9 URINARY RETENTION: ICD-10-CM

## 2017-09-26 DIAGNOSIS — M25.511 RIGHT SHOULDER PAIN, UNSPECIFIED CHRONICITY: ICD-10-CM

## 2017-09-26 DIAGNOSIS — R00.2 HEART PALPITATIONS: ICD-10-CM

## 2017-09-26 DIAGNOSIS — Z23 IMMUNIZATION DUE: ICD-10-CM

## 2017-09-26 DIAGNOSIS — G47.00 INSOMNIA, UNSPECIFIED TYPE: ICD-10-CM

## 2017-09-26 DIAGNOSIS — N39.0 ACUTE LOWER UTI (URINARY TRACT INFECTION): Primary | ICD-10-CM

## 2017-09-26 DIAGNOSIS — R00.0 TACHYCARDIA: ICD-10-CM

## 2017-09-26 LAB
BACTERIA #/AREA URNS HPF: ABNORMAL /HPF
BILIRUB UR QL STRIP: NEGATIVE
CLARITY UR: CLEAR
COLOR UR: YELLOW
GLUCOSE UR QL STRIP: NEGATIVE
HGB UR QL STRIP: ABNORMAL
KETONES UR QL STRIP: NEGATIVE
LEUKOCYTE ESTERASE UR QL STRIP: ABNORMAL
MICROSCOPIC COMMENT: ABNORMAL
NITRITE UR QL STRIP: NEGATIVE
PH UR STRIP: 6 [PH] (ref 5–8)
PROT UR QL STRIP: NEGATIVE
RBC #/AREA URNS HPF: 1 /HPF (ref 0–4)
SP GR UR STRIP: 1.01 (ref 1–1.03)
SQUAMOUS #/AREA URNS HPF: 4 /HPF
URN SPEC COLLECT METH UR: ABNORMAL
WBC #/AREA URNS HPF: 12 /HPF (ref 0–5)

## 2017-09-26 PROCEDURE — 99213 OFFICE O/P EST LOW 20 MIN: CPT | Mod: S$GLB,,, | Performed by: NURSE PRACTITIONER

## 2017-09-26 PROCEDURE — 73030 X-RAY EXAM OF SHOULDER: CPT | Mod: TC,PO,RT

## 2017-09-26 PROCEDURE — 73030 X-RAY EXAM OF SHOULDER: CPT | Mod: 26,RT,, | Performed by: RADIOLOGY

## 2017-09-26 PROCEDURE — 93010 ELECTROCARDIOGRAM REPORT: CPT | Mod: S$GLB,,, | Performed by: INTERNAL MEDICINE

## 2017-09-26 PROCEDURE — 81000 URINALYSIS NONAUTO W/SCOPE: CPT | Mod: PO

## 2017-09-26 PROCEDURE — 3008F BODY MASS INDEX DOCD: CPT | Mod: S$GLB,,, | Performed by: NURSE PRACTITIONER

## 2017-09-26 PROCEDURE — 93005 ELECTROCARDIOGRAM TRACING: CPT | Mod: S$GLB,,, | Performed by: NURSE PRACTITIONER

## 2017-09-26 PROCEDURE — 87086 URINE CULTURE/COLONY COUNT: CPT

## 2017-09-26 PROCEDURE — 99999 PR PBB SHADOW E&M-EST. PATIENT-LVL III: CPT | Mod: PBBFAC,,, | Performed by: NURSE PRACTITIONER

## 2017-09-26 RX ORDER — SULFAMETHOXAZOLE AND TRIMETHOPRIM 800; 160 MG/1; MG/1
1 TABLET ORAL 2 TIMES DAILY
Qty: 20 TABLET | Refills: 0 | Status: SHIPPED | OUTPATIENT
Start: 2017-09-26 | End: 2017-10-06

## 2017-09-26 RX ORDER — TEMAZEPAM 30 MG/1
30 CAPSULE ORAL NIGHTLY PRN
Refills: 5 | COMMUNITY
Start: 2017-09-04 | End: 2017-09-29

## 2017-09-26 RX ORDER — DICLOFENAC SODIUM 10 MG/G
2 GEL TOPICAL 4 TIMES DAILY PRN
Qty: 100 G | Refills: 0 | Status: SHIPPED | OUTPATIENT
Start: 2017-09-26 | End: 2017-12-27 | Stop reason: SDUPTHER

## 2017-09-26 RX ORDER — TEMAZEPAM 30 MG/1
30 CAPSULE ORAL NIGHTLY PRN
Qty: 5 CAPSULE | Refills: 0 | Status: SHIPPED | OUTPATIENT
Start: 2017-09-26 | End: 2017-09-29 | Stop reason: SDUPTHER

## 2017-09-26 NOTE — PROGRESS NOTES
Subjective:      Patient ID: Awilda Segovia is a 62 y.o. female.    Chief Complaint: Palpitations; Urinary Retention; and Shoulder Pain (right)    HPI   Urinary retension x 1 episode this morning but does admit some intermittent urinary incontinence that has been ongoing.  Denies fever, dysuria, suprapubic pain, flank pain, hematuria.      She also reports right shoulder pain x 3 months.  She reports she falls often, but does not recall a specific trauma or injury to her right shoulder.  She reports right sided weakness consistent with her PD diagnosis.  She does not report limited range of motion to right arm/shoulder.  She has been doing PT with home health and reports this has improved her pain.  She also reports tylenol and aleve PRN helps alleviate pain.    Patient also reports home health urged her to have her heart rate checked out due to elevation in heart rate at each visit.  She denies palpitations, chest pain, shortness of breath, dyspnea on exertion.  It is noted in her chart that her heart rate has been elevated in the 110's over the last year.    Patient with a diagnosis of insomnia in which she takes temazepam for.  She is compliant with the medication and does not experience any side effects.  She is short 5 pills for the month and unsure if the medication was mistakenly under filled or if she dropped and lost them.    Review of Systems   Constitutional: Negative for chills, fatigue and fever.   HENT: Negative.    Eyes: Negative.    Respiratory: Negative for cough, shortness of breath and wheezing.    Cardiovascular: Negative for chest pain, palpitations and leg swelling.        Elevated heart rate   Gastrointestinal: Negative.    Endocrine: Negative.    Genitourinary: Positive for difficulty urinating. Negative for decreased urine volume, dysuria, flank pain, frequency, hematuria and urgency.   Musculoskeletal: Positive for arthralgias (right shoulder).   Skin: Negative for rash and wound.  "  Neurological: Negative for weakness and numbness.   Psychiatric/Behavioral: Positive for sleep disturbance (insomnia at times). The patient is not nervous/anxious.        Objective:     /74   Pulse (!) 117   Ht 5' 1" (1.549 m)   Wt 47.8 kg (105 lb 6.4 oz)   BMI 19.92 kg/m²     Physical Exam   Constitutional: She is oriented to person, place, and time. She appears well-developed and well-nourished.   HENT:   Head: Normocephalic.   Eyes: Pupils are equal, round, and reactive to light.   Neck: No JVD present. Carotid bruit is not present.   Cardiovascular: Regular rhythm and normal heart sounds.  Tachycardia present.    No murmur heard.  Pulses:       Carotid pulses are 2+ on the right side, and 2+ on the left side.       Radial pulses are 2+ on the right side, and 2+ on the left side.   Heart rate 120 auscultated   Pulmonary/Chest: Effort normal and breath sounds normal. No respiratory distress. She has no wheezes. She has no rales.   Abdominal: Soft. Normal appearance and bowel sounds are normal. There is no tenderness. There is no CVA tenderness.   Musculoskeletal:        Right shoulder: She exhibits crepitus and pain. She exhibits normal range of motion, no tenderness, no bony tenderness, no swelling, no effusion, no deformity, no spasm and normal pulse.   Shuffling gait, uses walker.  Right sided weakness   Neurological: She is alert and oriented to person, place, and time.   Skin: Skin is warm and dry. No rash noted.   Psychiatric: She has a normal mood and affect. Her behavior is normal. Judgment and thought content normal.   Vitals reviewed.  U/a in clinic abnormal  Assessment:     1. Acute lower UTI (urinary tract infection)    2. Urinary retention    3. Heart palpitations    4. Tachycardia    5. Right shoulder pain, unspecified chronicity    6. Insomnia, unspecified type    7. Immunization due        Plan:     Acute lower UTI (urinary tract infection)  -     Urinalysis  -     Urine culture  -     " Urinalysis Microscopic  -     sulfamethoxazole-trimethoprim 800-160mg (BACTRIM DS) 800-160 mg Tab; Take 1 tablet by mouth 2 (two) times daily.  Dispense: 20 tablet; Refill: 0    Urinary retention  -     Urinalysis  -     Urine culture  -     Urinalysis Microscopic    Heart palpitations  -     EKG 12-lead  -     TSH; Future; Expected date: 09/26/2017  -     CBC auto differential; Future; Expected date: 09/26/2017  -     Comprehensive metabolic panel; Future; Expected date: 09/26/2017  -     Ambulatory referral to Cardiology    Tachycardia  -     Ambulatory referral to Cardiology  -     EKG 12-lead  -     TSH; Future; Expected date: 09/26/2017  -     CBC auto differential; Future; Expected date: 09/26/2017  -     Comprehensive metabolic panel; Future; Expected date: 09/26/2017    Right shoulder pain, unspecified chronicity  -     X-ray Shoulder 2 or More Views Right; Future; Expected date: 09/26/2017  -     diclofenac sodium (VOLTAREN) 1 % Gel; Apply 2 g topically 4 (four) times daily as needed.  Dispense: 100 g; Refill: 0    Insomnia, unspecified type  -     temazepam (RESTORIL) 30 mg capsule; Take 1 capsule (30 mg total) by mouth nightly as needed for Insomnia.  Dispense: 5 capsule; Refill: 0    Immunization due        -     Influenza - Quadrivalent (3 years & older) (PF)      Return if symptoms worsen or fail to improve.

## 2017-09-27 LAB — BACTERIA UR CULT: NO GROWTH

## 2017-09-27 PROCEDURE — 90471 IMMUNIZATION ADMIN: CPT | Mod: S$GLB,,, | Performed by: NURSE PRACTITIONER

## 2017-09-27 PROCEDURE — 90686 IIV4 VACC NO PRSV 0.5 ML IM: CPT | Mod: S$GLB,,, | Performed by: NURSE PRACTITIONER

## 2017-09-28 ENCOUNTER — TELEPHONE (OUTPATIENT)
Dept: FAMILY MEDICINE | Facility: CLINIC | Age: 62
End: 2017-09-28

## 2017-09-28 ENCOUNTER — OFFICE VISIT (OUTPATIENT)
Dept: CARDIOLOGY | Facility: CLINIC | Age: 62
End: 2017-09-28
Payer: COMMERCIAL

## 2017-09-28 VITALS
SYSTOLIC BLOOD PRESSURE: 129 MMHG | DIASTOLIC BLOOD PRESSURE: 70 MMHG | BODY MASS INDEX: 19.83 KG/M2 | WEIGHT: 105 LBS | HEIGHT: 61 IN | HEART RATE: 115 BPM

## 2017-09-28 DIAGNOSIS — R00.0 SINUS TACHYCARDIA: ICD-10-CM

## 2017-09-28 DIAGNOSIS — R63.4 WEIGHT LOSS, ABNORMAL: ICD-10-CM

## 2017-09-28 DIAGNOSIS — G20.C PARKINSONISM, UNSPECIFIED PARKINSONISM TYPE: Primary | ICD-10-CM

## 2017-09-28 PROCEDURE — 99203 OFFICE O/P NEW LOW 30 MIN: CPT | Mod: S$GLB,,, | Performed by: INTERNAL MEDICINE

## 2017-09-28 PROCEDURE — 99999 PR PBB SHADOW E&M-EST. PATIENT-LVL III: CPT | Mod: PBBFAC,,, | Performed by: INTERNAL MEDICINE

## 2017-09-28 PROCEDURE — 3008F BODY MASS INDEX DOCD: CPT | Mod: S$GLB,,, | Performed by: INTERNAL MEDICINE

## 2017-09-28 NOTE — TELEPHONE ENCOUNTER
----- Message from Iman Roth sent at 9/28/2017  1:04 PM CDT -----  Contact: [facility]- brittney- [phone]   Would like to consult with nurse about pt. Had two falls no injuries. Would like to extended occupational therapy. Please call back at [phone]. Thx. michael

## 2017-09-28 NOTE — PROGRESS NOTES
"Subjective:    Patient ID:  Awilda Segovia is a 62 y.o. female who presents for evaluation of new pt (new pt); Tachycardia; and Establish Care      HPI62 yo WF with Parkinson's referred for rapid heart rate. EKG shows sinus tachycardia at rate of 115. Patient states she has no palpitations and it does no bother her.    Review of Systems   Constitution: Positive for weakness and weight loss.   Cardiovascular: Negative for irregular heartbeat and palpitations.   Neurological: Positive for focal weakness, loss of balance and numbness.        Objective:    Physical Exam   Constitutional: She is oriented to person, place, and time. She appears well-developed and well-nourished.   /70 (BP Location: Left arm, Patient Position: Sitting, BP Method: Large (Automatic))   Pulse (!) 115   Ht 5' 1" (1.549 m)   Wt 47.6 kg (105 lb)   BMI 19.84 kg/m²   Very thin WF using walker for balance     HENT:   Head: Normocephalic and atraumatic.   Right Ear: External ear normal.   Left Ear: External ear normal.   Nose: Nose normal.   Mouth/Throat: Oropharynx is clear and moist.   Eyes: Conjunctivae, EOM and lids are normal. Pupils are equal, round, and reactive to light. Right eye exhibits no discharge. Left eye exhibits no discharge. Right conjunctiva has no hemorrhage. No scleral icterus.   Neck: Normal range of motion. Neck supple. No JVD present. No tracheal deviation present. No thyromegaly present.   Cardiovascular: Regular rhythm, normal heart sounds and intact distal pulses.  Tachycardia present.  Exam reveals no gallop and no friction rub.    No murmur heard.  Pulmonary/Chest: Effort normal and breath sounds normal. No respiratory distress. She has no wheezes. She has no rales. She exhibits no tenderness. Breasts are symmetrical.   Abdominal: Soft. Bowel sounds are normal. She exhibits no distension and no mass. There is no hepatosplenomegaly or hepatomegaly. There is no tenderness. There is no rebound and no guarding. "   Musculoskeletal: Normal range of motion. She exhibits no edema or tenderness.   Lymphadenopathy:     She has no cervical adenopathy.   Neurological: She is alert and oriented to person, place, and time. She displays normal reflexes. No cranial nerve deficit. Coordination normal.   Skin: Skin is warm and dry. No rash noted. No erythema. No pallor.   Psychiatric: She has a normal mood and affect. Her behavior is normal. Judgment and thought content normal.   Nursing note and vitals reviewed.        Assessment:       1. Parkinsonism, unspecified Parkinsonism type    2. Weight loss, abnormal    3. Sinus tachycardia         Plan:     She has asymptomatic sinus tachycardia that is most likely related to autonomic dysfunction as part of her systemic illness. Since asymptomatic requires no specific treatment.  No orders of the defined types were placed in this encounter.    Return if symptoms worsen or fail to improve.

## 2017-09-28 NOTE — TELEPHONE ENCOUNTER
Verbal order given, written order put in Dr. Ivan's box for signature. Needs to be faxed to [phone]

## 2017-09-28 NOTE — LETTER
September 28, 2017      Osito Huizar, TONI  [address] [facility] [id number] [place] [address] Cardiology  [address] [facility] [address]  Phone: [phone]  Fax: [phone]          Patient: Awilda Segovia   MR Number: [id number]   YOB: 1955   Date of Visit: 9/28/2017       Dear Osito Huizar:    Thank you for referring Awilda Segovia to me for evaluation. Attached you will find relevant portions of my assessment and plan of care.    If you have questions, please do not hesitate to call me. I look forward to following Awilda Segovia along with you.    Sincerely,    Jeremy Clifford Jr., MD    Enclosure  CC:  No Recipients    If you would like to receive this communication electronically, please contact [email or web address] or [phone] to request more information on [employer] access.    For providers and/or their staff who would like to refer a patient to [facility], please contact us through our one-stop-shop provider referral line, Linwood Daniel, at [phone].    If you feel you have received this communication in error or would no longer like to receive these types of communications, please e-mail [email or web address]

## 2017-09-29 ENCOUNTER — TELEPHONE (OUTPATIENT)
Dept: FAMILY MEDICINE | Facility: CLINIC | Age: 62
End: 2017-09-29

## 2017-09-29 DIAGNOSIS — G47.00 INSOMNIA, UNSPECIFIED TYPE: ICD-10-CM

## 2017-09-29 RX ORDER — TEMAZEPAM 30 MG/1
30 CAPSULE ORAL NIGHTLY PRN
Qty: 30 CAPSULE | Refills: 5 | Status: SHIPPED | OUTPATIENT
Start: 2017-09-29 | End: 2017-11-27 | Stop reason: SDUPTHER

## 2017-09-29 NOTE — TELEPHONE ENCOUNTER
----- Message from Janet Franco sent at 9/29/2017  3:34 PM CDT -----  Contact: pt   Call pt regarding her sleeping med that pt is out of.    ..[phone] (home)

## 2017-09-29 NOTE — TELEPHONE ENCOUNTER
I have signed for the following orders AND/OR meds.  Please call the patient and ask the patient to schedule the testing AND/OR inform about any medications that were sent.      No orders of the defined types were placed in this encounter.        Medications Ordered This Encounter      temazepam (RESTORIL) 30 mg capsule          Sig: Take 1 capsule (30 mg total) by mouth nightly as needed for Insomnia.          Dispense:  30 capsule          Refill:  5

## 2017-10-03 ENCOUNTER — TELEPHONE (OUTPATIENT)
Dept: FAMILY MEDICINE | Facility: CLINIC | Age: 62
End: 2017-10-03

## 2017-10-03 NOTE — TELEPHONE ENCOUNTER
----- Message from Tessie Barber MA sent at 10/3/2017 12:48 PM CDT -----  Contact: Pt occupation therapist - brittney       ----- Message -----  From: Sharifa Peacock  Sent: 10/3/2017  12:44 PM  To: Luca PETERSON Staff    States she is calling rg seen a cardiologist due to elevated resting heart rate and has a chart in file and needs to have it updated to be a little higher due to it running 120 + and can be reached at [phone]//thanks/dbw

## 2017-10-03 NOTE — TELEPHONE ENCOUNTER
----- Message from Puja Escobar sent at 10/3/2017  9:46 AM CDT -----  Contact: Alissa Ulrich/BÁRBARA /[facility]  Alissa called and stated the patient has been having an elevated pulse of 132 and she seen the cardiologist last week and he said she is having a normal rate. She just wanted Dr. Segovia to be aware. She can be contacted at [phone].    Thanks,  Puja

## 2017-10-17 DIAGNOSIS — F32.A DEPRESSION, UNSPECIFIED DEPRESSION TYPE: ICD-10-CM

## 2017-10-18 RX ORDER — CITALOPRAM 10 MG/1
10 TABLET ORAL DAILY
Qty: 30 TABLET | Refills: 11 | Status: SHIPPED | OUTPATIENT
Start: 2017-10-18 | End: 2017-11-27

## 2017-10-23 RX ORDER — CEPHALEXIN 500 MG/1
500 CAPSULE ORAL EVERY 12 HOURS
Qty: 14 CAPSULE | Refills: 0 | Status: SHIPPED | OUTPATIENT
Start: 2017-10-23 | End: 2017-11-17

## 2017-11-17 ENCOUNTER — OFFICE VISIT (OUTPATIENT)
Dept: NEUROLOGY | Facility: CLINIC | Age: 62
End: 2017-11-17
Payer: COMMERCIAL

## 2017-11-17 VITALS
RESPIRATION RATE: 20 BRPM | DIASTOLIC BLOOD PRESSURE: 59 MMHG | HEART RATE: 107 BPM | WEIGHT: 100.5 LBS | SYSTOLIC BLOOD PRESSURE: 118 MMHG | HEIGHT: 61 IN | BODY MASS INDEX: 18.98 KG/M2

## 2017-11-17 DIAGNOSIS — R63.4 WEIGHT LOSS, ABNORMAL: ICD-10-CM

## 2017-11-17 DIAGNOSIS — G20.C PARKINSONISM, UNSPECIFIED PARKINSONISM TYPE: Primary | ICD-10-CM

## 2017-11-17 PROCEDURE — 99214 OFFICE O/P EST MOD 30 MIN: CPT | Mod: S$GLB,,, | Performed by: PSYCHIATRY & NEUROLOGY

## 2017-11-17 PROCEDURE — 99999 PR PBB SHADOW E&M-EST. PATIENT-LVL III: CPT | Mod: PBBFAC,,, | Performed by: PSYCHIATRY & NEUROLOGY

## 2017-11-17 RX ORDER — LEVODOPA AND CARBIDOPA 195; 48.75 MG/1; MG/1
1 CAPSULE, EXTENDED RELEASE ORAL 4 TIMES DAILY
Refills: 6 | COMMUNITY
Start: 2017-10-25 | End: 2017-11-17 | Stop reason: DRUGHIGH

## 2017-11-17 NOTE — ASSESSMENT & PLAN NOTE
Progression in symptoms; marked weight loss.  Felt Rytary 95 was more beneficial than the 195.  I'm still not convinced she has a typical PD, but regardless of etiology, her treatment is limited by her nausea.   -> change back to 2 pills of 95 qid   -> resume carbidopa as needed   -> continue xyzal for nausea (she finds benefit)   -> labs today for paraneoplastic panel

## 2017-11-17 NOTE — PATIENT INSTRUCTIONS
11/17/2017    Dear Awilda Segovia,    Due to overwhelming demand, my [place] clinic location books to capacity very quickly every month.  I apologize for any inconveniences or delays in care.    In order to be sure your Neurologic needs met, we have two Nurse Practitioners, Karina Webb and Taylor Alcala, who also see patients with Memory and Movement Disorders in [place].  And, of course, when possible, please consider booking your appointment in [place], where our specialists, including me, have more appointments available.       I would like to see you Return in about 3 months (around 2/17/2018) for parkinsonism..  As we do not yet have dates for clinic in [place] in 2018, you will be contacted by my assistant, Maria Esther, once we have a schedule.      Please CALL my office (number below) if you have NOT heard from us before January 6, 2018.      Sincerely,       Pamela Garcia MD  Director, Movement Disorders and DBS Program  Department of Neurology  [phone]

## 2017-11-17 NOTE — PROGRESS NOTES
"Awilda CONSTANTINO Chief Complaints during this visit:  f/u Patient visit for  Parkinsonism      Referring Physician:     No referring provider defined for this encounter.       Primary Care Physician:  Gato Ivan MD  [id number] [phone]      History of present illness:   62 y.o.  W seen in f/u for Parkinsonism.  Accompanied by .  Saw Chester Hagen in interim, put her on rytary which she felt was really helpful for a couple months, but now does not think it works.  She was on 2 of the 95's qid, but now on one of the 195's qid.      From Jon's note 9/13/17:  Last seen in office 8-7-17. At that time, discussed reduction of cd/ld CR to see if it helped alleviate any of her symptoms or if she could see that it actually provided any benefit. She also had an increased HR at that time, as is the case again today.      Since last visit, she went to a  who recommended she see another doctor. She saw Dr. Hagen.   He wanted her off CR for 3 days before seeing her. She fell 12 times, was dizzier and felt awful without it. Friend says she deteriorated so bad that she was nearly bed ridden.      By her account, Dr. Hagen felt she had PD, did not mention parkinsonism and had her begin Rytary 23.75 mg/ 95 mg 1 capsule QID and CR 25/100 QID. Doses 7-11-5-9.  She says everything is better on it. She She vomited twice when began regimen and continues with intermittent nausea.   She has not regained normal diet. She is still eating soft foods and fresh fruits. She had gotten down to 103 lbs and is now up to 105 lbs on her scale.      Interval history 5/25/17:  Very "ansy" in the afternoons/ evenings and says her legs are "ansy."  Still easy lability.    Double dose of sinemet at noon did not help.      Interval history 1/31/17:  Accompanied by a friend.  Has been seeing Karina in the interim who has been wondering if patient may have MSA.  One fall last month when tripping at top of " "stairs.  Lability has improved with celexa.    Sinemet CR + carbidopa has reduced nausea and she is tolerating.  However not sure the 4th dose of sinemet has had any effect.    Voice has been changing in last couple of weeks.  No trouble with swallowing.    Home orthostatics:  1/29:  130/70, 96 laying down, 122/66, 94 standing  1/30:  126/70, 92 laying down, 110/70, 100 standing    Interval history 5/26/16:  Complains of severe spasm mid back every afternoon into the evening for past couple months.   says she is tender on spinous process, but unable to detect a spasming muscle.  Finally goes away when her restoril takes effect.  Mobic helps a little.  Wakes up intermittently at night, 1-3am, wide awake.  Restless and difficulty getting comfortable in the evenings.  Says feels like "locusts" all over her.    Right hand ring finger as well as foot tingling and numb.  Never started the neudexta because of potential interaction with selegiline.  Despite this, she has done better with tears.    From my note 2/12/16:  Annoyed by a restless feeling in her right foot that worsens as she prepares to bed.  Also having severe insomnia.  Right hand is still swollen and had poor dexterity, and right foot rolling in, but no pain in right side any longer.  Cries easily, whether sad or happy.  She denies feeling depressed and is resistant to going back on prozac.    From my note 12/2/15:  ... seen in consultation at the request of  Dr. Segovia () for evaluation of joint pains and fatigue.  Symptoms started as joint pain, swelling in right hand about 6-8 months ago.  When typing, letters skipped with right hand.  Otherwise feels "fine."  Right arm, hand, leg, foot still have pain, intermittently.  Yoga feels good.  Seen by Kelley and given presumptive diagnosis of PD.  Had control of depression with wellbutrin and prozac, now off in past 3 weeks.  Recent active dreaming (screaming, laughing, kicks).  Not falling asleep " any more.  No problems with voice.       II.  Review of systems:  As in HPI, otherwise, balance 3 systems reviewed and are negative.    III.  Past Medical History:   Diagnosis Date    H/O hysterectomy for benign disease     Menopause present     Osteopenia     Parkinson disease 12/2015    Sprain of left foot     2008     Family History   Problem Relation Age of Onset    Diabetes Mother     Cataracts Mother     Hypertension Mother     Cancer Maternal Aunt      breast    Cancer Maternal Uncle      brain , bone     Cancer Paternal Aunt      uterine    Diabetes Maternal Grandfather     Cataracts Father      Social History     Social History    Marital status:      Spouse name: N/A    Number of children: N/A    Years of education: N/A     Social History Main Topics    Smoking status: Never Smoker    Smokeless tobacco: Never Used    Alcohol use 0.6 oz/week     1 Glasses of wine per week      Comment: social    Drug use: No    Sexual activity: Yes     Partners: Male     Other Topics Concern    None     Social History Narrative    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 Take 2 tablets at 7am, then 1 tab at noon, 6pm and 9:30pm) 540 tablet 3    citalopram (CELEXA) 10 MG tablet TAKE 1 TABLET (10 MG TOTAL) BY MOUTH ONCE DAILY. 30 tablet 11    cyclobenzaprine (FLEXERIL) 5 MG tablet TAKE 1 TABLET BY MOUTH 3 TIMES A DAY AS NEEDED FOR MUSCLE SPASMS. 90 tablet 5    diazePAM (VALIUM) 2 MG tablet Take 0.5-1 tablets (1-2 mg total) by mouth every 6 (six) hours as needed for Anxiety. 120 tablet 4    ESTRING 2 mg vaginal ring INSERT 1 VAGINAL RING(S) EVERY 3 MONTHS  1    gabapentin (NEURONTIN) 100 MG capsule TAKE 1-2 CAPSULES EVERY EVENING 60 capsule 11     "levocetirizine (XYZAL) 5 MG tablet Take 1 tablet (5 mg total) by mouth every evening. 30 tablet 11    temazepam (RESTORIL) 30 mg capsule Take 1 capsule (30 mg total) by mouth nightly as needed for Insomnia. 30 capsule 5    carbidopa-levodopa (RYTARY) 23.75-95 mg CpSR Take 2 capsules by mouth 4 (four) times daily. 240 capsule 4    diclofenac sodium (VOLTAREN) 1 % Gel Apply 2 g topically 4 (four) times daily as needed. 100 g 0     No current facility-administered medications for this visit.       PRIOR NEURO MEDICATIONS TRIED:  na    Review of patient's allergies indicates:   Allergen Reactions    Silver nitrate Anaphylaxis     Put wholes skin     Demerol [meperidine]      Can worsen PD    Reglan [metoclopramide hcl]      Can worsen PD    Risperdal [risperidone]      Can worsen PD         IV. Physical Exam (Includes Motor Unified Parkinson's Disease Rating Scale 2008)  Patient taking PD meds?         Vitals:    11/17/17 1116   BP: (!) 118/59   BP Location: Left arm   Patient Position: Sitting   BP Method: Medium (Automatic)   Pulse: 107   Resp: 20   Weight: 45.6 kg (100 lb 8.5 oz)   Height: 5' 1" (1.549 m)       Wt Readings from Last 10 Encounters:   11/17/17 45.6 kg (100 lb 8.5 oz)   09/28/17 47.6 kg (105 lb)   09/26/17 47.8 kg (105 lb 6.4 oz)   09/13/17 50.1 kg (110 lb 7.2 oz)   08/07/17 50.1 kg (110 lb 9 oz)   05/29/17 51.7 kg (113 lb 15.7 oz)   05/25/17 51.8 kg (114 lb 1.4 oz)   05/17/17 52.2 kg (115 lb 1.3 oz)   03/01/17 53.5 kg (118 lb)   01/31/17 54.7 kg (120 lb 9.5 oz)      General appearance: Well nourished, well developed, no acute distress       -------------------------------------------------------------  Affect: full       Orientation to time & place:  Oriented to time, place, person and situation       Attention & concentration:  Normal attention span and concentration       -------------------------------------------------------  Cranial nerves: normal visual acuity, visual fields full, optic " discs not visualized, pupils equal round and reactive, extraocular movements intact,       facial sensation intact, face symmetrical, hearing intact to whisper, palate raises midline, shoulder shrug strength normal, tongue protrudes midline.        -------------------------------------------------------  Muscle Bulk: all 4 extremities normal                                                   Muscle strength:  5/5 in all 4 extremities        No pronator drift    --------------------------------------------------------------  Unified Parkinson's Disease Rating Scale (motor part only)      UPDRS Motor Examination      Speech  Loss of prosody   Facial Expression  0 - Normal.   Rigidity     Neck 0 - Absent.   Upper Extremity: Right 2 - Mild or moderate.   Upper Extremity: Left 0 - Absent.   Lower Extremity: Right 2 - Mild or moderate.   Lower Extremity: Left 0 - Absent.     Finger Taps      right 3 - Severely impaired. Frequent hesitation in initiating movements or arrests in ongoing movement.   left 0 - Normal.   Hand Movements      right 3 - Severely impaired. Frequent hesitation in initiating movements or arrests in ongoing movement.   left 0 - Normal.   Pronation/supination of Hands      right 3 - Severely impaired. Frequent hesitation in initiating movements or arrests in ongoing movement.   left 0 - Normal.     Toe Tapping    right 3 - Severely impaired. Frequent hesitation in initiating movements or arrests in ongoing movement.   left 0 - Normal.     Leg Agility      right 2 - Moderately impaired. Definite and early fatiguing. May have occasional arrests in movement.   left 0 - Normal.   Arising from Chair  0 - Normal.   Posture  0 - Normal erect.   Gait  1 - Walks slowly, may shuffle with short steps, but no festination (hastening steps) or propulsion.   Freezing of gait 0: Normal: No freezing.    Postural Stability (Response to sudden, strong posterior displacement produced by pull on shoulders while patient  erect with eyes open and feet slightly apart.   1 - Retropulsion, but recovers unaided.    Body Bradykinesia and Hypokinesia (Combining slowness, hesitancy, decreased armswing, small amplitude, and poverty of movement in general)  1 - Minimal slowness, giving movement a deliberate character; could be normal for some persons. Possibly reduced amplitude.     Tremor at Rest:      Face, lips, chin 0 - Absent.    Hands:      right 0 - Absent.    left 0 - Absent.    Feet:     right 0 - Absent.    left 0 - Absent.    Constancy of REST tremor: 0: Normal: No tremor.   Postural tremor:    right 0 - Absent.    left 0 - Absent.    Kinetic tremor:    right 0 - Absent.    left 0 - Absent.    Dyskinesias present? No       Total Motor UPDRS:  22      V.  Laboratory/ Radiological Data:     MRI brain 2017:  A there is narrowing and blurring of the left and right pars compacta with diminished distinction between the substantia nigra and red nucleus bilaterally.  There is also hypointense signal present within the posterior basal ganglia bilaterally on the FLAIR pulse sequences.  These findings likely related to the provided history of Parkinson's disease        MRI brain 2015 personally reviewed and midbrain appears normal:            Lab Results   Component Value Date    TSH 1.287 09/26/2017         VI.     Problem List Items Addressed This Visit        1 - High    Parkinsonism - Primary    Overview     Right side effected (rigidity).  Probable CBD         Current Assessment & Plan     Progression in symptoms; marked weight loss.  Felt Rytary 95 was more beneficial than the 195.  I'm still not convinced she has a typical PD, but regardless of etiology, her treatment is limited by her nausea.   -> change back to 2 pills of 95 qid   -> resume carbidopa as needed   -> continue xyzal for nausea (she finds benefit)   -> labs today for paraneoplastic panel         Relevant Medications    carbidopa-levodopa (RYTARY) 23.75-95 mg CpSR       3      Weight loss, abnormal    Overview     6/2016 134lb  5/25/17 114lb  10/17/17 100lb         Relevant Orders    Paraneoplastic Autoantibody Evaulation, Serum            Return in about 3 months (around 2/17/2018) for parkinsonism.

## 2017-11-24 ENCOUNTER — LAB VISIT (OUTPATIENT)
Dept: LAB | Facility: HOSPITAL | Age: 62
End: 2017-11-24
Attending: PSYCHIATRY & NEUROLOGY
Payer: COMMERCIAL

## 2017-11-24 DIAGNOSIS — R63.4 WEIGHT LOSS, ABNORMAL: ICD-10-CM

## 2017-11-24 PROCEDURE — 86255 FLUORESCENT ANTIBODY SCREEN: CPT

## 2017-11-24 PROCEDURE — 36415 COLL VENOUS BLD VENIPUNCTURE: CPT | Mod: PO

## 2017-11-24 PROCEDURE — 86255 FLUORESCENT ANTIBODY SCREEN: CPT | Mod: 59

## 2017-11-24 PROCEDURE — 86256 FLUORESCENT ANTIBODY TITER: CPT

## 2017-11-24 PROCEDURE — 83519 RIA NONANTIBODY: CPT | Mod: 59

## 2017-11-25 ENCOUNTER — TELEPHONE (OUTPATIENT)
Dept: FAMILY MEDICINE | Facility: CLINIC | Age: 62
End: 2017-11-25

## 2017-11-25 RX ORDER — PANTOPRAZOLE SODIUM 40 MG/1
40 TABLET, DELAYED RELEASE ORAL DAILY
Qty: 30 TABLET | Refills: 11 | Status: SHIPPED | OUTPATIENT
Start: 2017-11-25 | End: 2017-12-27 | Stop reason: SDUPTHER

## 2017-11-25 RX ORDER — MEGESTROL ACETATE 40 MG/ML
400 SUSPENSION ORAL DAILY
Qty: 300 ML | Refills: 11 | Status: SHIPPED | OUTPATIENT
Start: 2017-11-25 | End: 2017-12-27 | Stop reason: SDUPTHER

## 2017-11-25 RX ORDER — TRAMADOL HYDROCHLORIDE 50 MG/1
50 TABLET ORAL EVERY 6 HOURS PRN
Qty: 120 TABLET | Refills: 2 | Status: SHIPPED | OUTPATIENT
Start: 2017-11-25 | End: 2017-12-05

## 2017-11-25 NOTE — TELEPHONE ENCOUNTER
She has had increased pain in her shoulder.  I reviewed her xray that she had. She has used NSAID's along with topical nsaids and heat, cold and PT and she is not better. She has also had an injection with no help.  They are requesting a pain pill.       She also has had chronic nausea with some abd pain. She has had a normal CBC and CMP and TSH recently.  She is on anti-nausea meds. She is not on a PPI.  She has lost weight and a paraneoplastic panel is pending.      I will start the following:    No orders of the defined types were placed in this encounter.      Medications Ordered This Encounter      megestrol (MEGACE) 400 mg/10 mL (40 mg/mL) Susp          Sig: Take 10 mLs (400 mg total) by mouth once daily.          Dispense:  300 mL          Refill:  11      pantoprazole (PROTONIX) 40 MG tablet          Sig: Take 1 tablet (40 mg total) by mouth once daily.          Dispense:  30 tablet          Refill:  11      traMADol (ULTRAM) 50 mg tablet          Sig: Take 1 tablet (50 mg total) by mouth every 6 (six) hours as needed for Pain.          Dispense:  120 tablet          Refill:  2      Please get her in with me next week.

## 2017-11-27 ENCOUNTER — TELEPHONE (OUTPATIENT)
Dept: FAMILY MEDICINE | Facility: CLINIC | Age: 62
End: 2017-11-27

## 2017-11-27 ENCOUNTER — OFFICE VISIT (OUTPATIENT)
Dept: FAMILY MEDICINE | Facility: CLINIC | Age: 62
End: 2017-11-27
Payer: COMMERCIAL

## 2017-11-27 VITALS
TEMPERATURE: 97 F | HEART RATE: 117 BPM | WEIGHT: 101.19 LBS | BODY MASS INDEX: 19.11 KG/M2 | HEIGHT: 61 IN | SYSTOLIC BLOOD PRESSURE: 110 MMHG | DIASTOLIC BLOOD PRESSURE: 67 MMHG

## 2017-11-27 DIAGNOSIS — G89.29 CHRONIC RIGHT SHOULDER PAIN: ICD-10-CM

## 2017-11-27 DIAGNOSIS — G47.00 INSOMNIA, UNSPECIFIED TYPE: ICD-10-CM

## 2017-11-27 DIAGNOSIS — M62.838 NECK MUSCLE SPASM: Primary | ICD-10-CM

## 2017-11-27 DIAGNOSIS — R11.0 NAUSEA: ICD-10-CM

## 2017-11-27 DIAGNOSIS — R63.4 WEIGHT LOSS, ABNORMAL: ICD-10-CM

## 2017-11-27 DIAGNOSIS — F32.1 MODERATE SINGLE CURRENT EPISODE OF MAJOR DEPRESSIVE DISORDER: ICD-10-CM

## 2017-11-27 DIAGNOSIS — M25.511 CHRONIC RIGHT SHOULDER PAIN: ICD-10-CM

## 2017-11-27 PROCEDURE — 90471 IMMUNIZATION ADMIN: CPT | Mod: S$GLB,,, | Performed by: FAMILY MEDICINE

## 2017-11-27 PROCEDURE — 99999 PR PBB SHADOW E&M-EST. PATIENT-LVL IV: CPT | Mod: PBBFAC,,, | Performed by: FAMILY MEDICINE

## 2017-11-27 PROCEDURE — 90732 PPSV23 VACC 2 YRS+ SUBQ/IM: CPT | Mod: S$GLB,,, | Performed by: FAMILY MEDICINE

## 2017-11-27 PROCEDURE — 99214 OFFICE O/P EST MOD 30 MIN: CPT | Mod: 25,S$GLB,, | Performed by: FAMILY MEDICINE

## 2017-11-27 RX ORDER — TEMAZEPAM 30 MG/1
30 CAPSULE ORAL NIGHTLY PRN
Qty: 30 CAPSULE | Refills: 5 | Status: SHIPPED | OUTPATIENT
Start: 2017-11-27 | End: 2017-12-27 | Stop reason: SDUPTHER

## 2017-11-27 RX ORDER — DULOXETIN HYDROCHLORIDE 30 MG/1
CAPSULE, DELAYED RELEASE ORAL
Qty: 60 CAPSULE | Refills: 0 | Status: SHIPPED | OUTPATIENT
Start: 2017-11-27 | End: 2017-12-11 | Stop reason: SDUPTHER

## 2017-11-27 NOTE — PROGRESS NOTES
Subjective:      Patient ID: Awilda Segovia is a 62 y.o. female.    Chief Complaint: Follow-up    HPI  She has had peristent pain in the right neck muscle and shoulder for months.  She has used NSAID's and pain medicine along with injection of a steroid in the area and this has not helped.  This is exacerbated by her being in bed a lot and not moving a lot with her parkinsons disease that she has.      She has insomnia and she states that if she does not have something to help her, she cannot sleep.  She has been on restoril for this.  It helps some but the pain hurts this issue.    She has had major depression over her issue of debility and she has been on celexa 10 mg a day.  It is not helping her at all right now she states.  She has been having nausea and weight loss and I started her on megace 2 days ago for this and it is better today.  She feels like eating.  She will continue this medicine for now.      Health Maintenance Due   Topic Date Due    TETANUS VACCINE  04/07/1971    Zoster Vaccine  04/07/2013    Colonoscopy  10/03/2015       Past Medical History:  Past Medical History:   Diagnosis Date    H/O hysterectomy for benign disease     Menopause present     Osteopenia     Parkinson disease 12/2015    Sprain of left foot     2008     Past Surgical History:   Procedure Laterality Date    APPENDECTOMY      CHOLECYSTECTOMY      HYSTERECTOMY      TONSILLECTOMY, ADENOIDECTOMY       Review of patient's allergies indicates:   Allergen Reactions    Silver nitrate Anaphylaxis     Put wholes skin     Demerol [meperidine]      Can worsen PD    Reglan [metoclopramide hcl]      Can worsen PD    Risperdal [risperidone]      Can worsen PD     Current Outpatient Prescriptions on File Prior to Visit   Medication Sig Dispense Refill    carbidopa-levodopa (RYTARY) 23.75-95 mg CpSR Take 2 capsules by mouth 4 (four) times daily. 240 capsule 4    carbidopa-levodopa  mg (SINEMET CR)  mg TbSR Take 2  tablets at 7am, then 1 tab at noon, 6pm and 9:30pm (Patient taking differently: Take 1 tablet by mouth 4 (four) times daily. Take 2 tablets at 7am, then 1 tab at noon, 6pm and 9:30pm) 540 tablet 3    cyclobenzaprine (FLEXERIL) 5 MG tablet TAKE 1 TABLET BY MOUTH 3 TIMES A DAY AS NEEDED FOR MUSCLE SPASMS. 90 tablet 5    diazePAM (VALIUM) 2 MG tablet Take 0.5-1 tablets (1-2 mg total) by mouth every 6 (six) hours as needed for Anxiety. 120 tablet 4    ESTRING 2 mg vaginal ring INSERT 1 VAGINAL RING(S) EVERY 3 MONTHS  1    gabapentin (NEURONTIN) 100 MG capsule TAKE 1-2 CAPSULES EVERY EVENING 60 capsule 11    megestrol (MEGACE) 400 mg/10 mL (40 mg/mL) Susp Take 10 mLs (400 mg total) by mouth once daily. 300 mL 11    pantoprazole (PROTONIX) 40 MG tablet Take 1 tablet (40 mg total) by mouth once daily. 30 tablet 11    traMADol (ULTRAM) 50 mg tablet Take 1 tablet (50 mg total) by mouth every 6 (six) hours as needed for Pain. 120 tablet 2    [DISCONTINUED] citalopram (CELEXA) 10 MG tablet TAKE 1 TABLET (10 MG TOTAL) BY MOUTH ONCE DAILY. 30 tablet 11    [DISCONTINUED] temazepam (RESTORIL) 30 mg capsule Take 1 capsule (30 mg total) by mouth nightly as needed for Insomnia. 30 capsule 5    diclofenac sodium (VOLTAREN) 1 % Gel Apply 2 g topically 4 (four) times daily as needed. 100 g 0    levocetirizine (XYZAL) 5 MG tablet Take 1 tablet (5 mg total) by mouth every evening. 30 tablet 11    [DISCONTINUED] venlafaxine (EFFEXOR-XR) 75 MG 24 hr capsule Take 1 capsule (75 mg total) by mouth once daily. 30 capsule 11     No current facility-administered medications on file prior to visit.      Social History     Social History    Marital status:      Spouse name: N/A    Number of children: N/A    Years of education: N/A     Occupational History    Not on file.     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 Negative for fever.   HENT: Negative for rhinorrhea.    Respiratory: Negative for cough and shortness of breath.    Cardiovascular: Negative for chest pain and palpitations.   Gastrointestinal: Positive for nausea. Negative for abdominal pain, anal bleeding, constipation, diarrhea and vomiting.   Genitourinary: Negative for dysuria and hematuria.   Musculoskeletal: Positive for arthralgias, myalgias and neck pain.   Neurological: Positive for dizziness, speech difficulty and weakness.       Objective:   /67   Pulse (!) 117   Temp 97.3 °F (36.3 °C) (Oral)   Ht 5' 1" (1.549 m)   Wt 45.9 kg (101 lb 3.1 oz)   BMI 19.12 kg/m²     Physical Exam   Constitutional: She appears cachectic. She is active and cooperative. She has a sickly appearance.   HENT:   Head: Normocephalic and atraumatic.   Eyes: EOM are normal. Pupils are equal, round, and reactive to light.   Neck: Tracheal tenderness present. Decreased range of motion present. Thyromegaly present.       Cardiovascular: Normal rate, regular rhythm and normal heart sounds.    Pulmonary/Chest: Effort normal and breath sounds normal. No respiratory distress. She has no wheezes. She has no rales.   Abdominal: Soft. Bowel sounds are normal.   Musculoskeletal: She exhibits edema.   Lymphadenopathy:     She has cervical adenopathy.   Neurological: She is alert.   Skin: Skin is warm. "       Assessment:     1. Neck muscle spasm    2. Insomnia, unspecified type    3. Moderate single current episode of major depressive disorder    4. Chronic right shoulder pain    5. Nausea    6. Weight loss, abnormal        Plan:   Awilda was seen today for follow-up.    Diagnoses and all orders for this visit:    Neck muscle spasm    Insomnia, unspecified type  -     temazepam (RESTORIL) 30 mg capsule; Take 1 capsule (30 mg total) by mouth nightly as needed for Insomnia.    Moderate single current episode of major depressive disorder  -     DULoxetine (CYMBALTA) 30 MG capsule; Take 1 po q AM x 7 days then 2 po q AM.    Chronic right shoulder pain  -     DULoxetine (CYMBALTA) 30 MG capsule; Take 1 po q AM x 7 days then 2 po q AM.    Nausea    Weight loss, abnormal  -     Fecal Immunochemical Test (iFOBT); Future    Other orders  -     Pneumococcal Polysaccharide Vaccine (23 Valent) (SQ/IM)    stop the celexa at this time as it is not helping.  She is due for a colonoscopy for screening but at this time due to her debility, I will just check a FIT test.  Cont the megace and if the nausea is held at bay, maybe she can eat more and gain some weight.  rtc in 1 month to see me.  Call me if things change.

## 2017-11-30 LAB
CASPR2-IGG CBA: NEGATIVE
LGI1-IGG CBA: NEGATIVE

## 2017-12-01 LAB
ACHR BIND AB SER-SCNC: 0 NMOL/L
AMPHIPHYSIN AB TITR SER: NEGATIVE TITER
CV2 IGG TITR SER: NEGATIVE TITER
GLIAL NUC TYPE 1 AB TITR SER: NEGATIVE TITER
HU1 AB TITR SER: NEGATIVE TITER
HU2 AB TITR SER IF: NEGATIVE TITER
HU3 AB TITR SER: NEGATIVE TITER
IMMUNOLOGIST REVIEW: NORMAL
NACHR AB SER-SCNC: 0 NMOL/L
PAVAL REFLEX TEST ADDED: NORMAL
PCA-1 AB TITR SER: NEGATIVE TITER
PCA-2 AB TITR SER: NEGATIVE TITER
PCA-TR AB TITR SER: NEGATIVE TITER
STRIA MUS AB TITR SER: NEGATIVE TITER
VGCC-N BIND AB SER-SCNC: 0 NMOL/L
VGCC-P/Q BIND AB SER-SCNC: 0 NMOL/L
VGKC AB SER-SCNC: 0.01 NMOL/L

## 2017-12-11 ENCOUNTER — TELEPHONE (OUTPATIENT)
Dept: FAMILY MEDICINE | Facility: CLINIC | Age: 62
End: 2017-12-11

## 2017-12-11 DIAGNOSIS — G89.29 CHRONIC RIGHT SHOULDER PAIN: ICD-10-CM

## 2017-12-11 DIAGNOSIS — F32.1 MODERATE SINGLE CURRENT EPISODE OF MAJOR DEPRESSIVE DISORDER: ICD-10-CM

## 2017-12-11 DIAGNOSIS — M25.511 CHRONIC RIGHT SHOULDER PAIN: ICD-10-CM

## 2017-12-11 RX ORDER — HYDROCODONE BITARTRATE AND ACETAMINOPHEN 5; 325 MG/1; MG/1
1 TABLET ORAL 2 TIMES DAILY PRN
Qty: 60 TABLET | Refills: 0 | Status: SHIPPED | OUTPATIENT
Start: 2017-12-11 | End: 2017-12-27

## 2017-12-11 RX ORDER — GABAPENTIN 300 MG/1
300 CAPSULE ORAL 2 TIMES DAILY
Qty: 60 CAPSULE | Refills: 11 | Status: SHIPPED | OUTPATIENT
Start: 2017-12-11 | End: 2017-12-27 | Stop reason: SDUPTHER

## 2017-12-11 RX ORDER — DULOXETIN HYDROCHLORIDE 30 MG/1
CAPSULE, DELAYED RELEASE ORAL
Qty: 90 CAPSULE | Refills: 0 | Status: SHIPPED | OUTPATIENT
Start: 2017-12-11 | End: 2017-12-27 | Stop reason: SDUPTHER

## 2017-12-11 NOTE — TELEPHONE ENCOUNTER
----- Message from Cyndi Mendez sent at 12/11/2017 10:37 AM CST -----  Patient states her shoulder is still hurting, she states Diclofenac Sodium Gel is not helping, contact patient at [phone] to advise.     Thank you

## 2017-12-11 NOTE — TELEPHONE ENCOUNTER
I increased cymbalta from 60 to 90 q day  I change the gabapentin from 200 mg po q hs to 300 mg po bid   Stop the tramadol and use hydrococone 5 mg po bid prn pain.      I have signed for the following orders AND/OR meds.  Please call the patient and ask the patient to schedule the testing AND/OR inform about any medications that were sent.      No orders of the defined types were placed in this encounter.        Medications Ordered This Encounter      DULoxetine (CYMBALTA) 30 MG capsule          Sig: Take 3 po q day          Dispense:  90 capsule          Refill:  0      gabapentin (NEURONTIN) 300 MG capsule          Sig: Take 1 capsule (300 mg total) by mouth 2 (two) times daily.          Dispense:  60 capsule          Refill:  11      hydrocodone-acetaminophen 5-325mg (NORCO) 5-325 mg per tablet          Sig: Take 1 tablet by mouth 2 (two) times daily as needed for Pain.          Dispense:  60 tablet          Refill:  0

## 2017-12-13 ENCOUNTER — PATIENT OUTREACH (OUTPATIENT)
Dept: ADMINISTRATIVE | Facility: HOSPITAL | Age: 62
End: 2017-12-13

## 2017-12-27 ENCOUNTER — OFFICE VISIT (OUTPATIENT)
Dept: FAMILY MEDICINE | Facility: CLINIC | Age: 62
End: 2017-12-27
Payer: COMMERCIAL

## 2017-12-27 VITALS
TEMPERATURE: 98 F | HEIGHT: 61 IN | SYSTOLIC BLOOD PRESSURE: 122 MMHG | BODY MASS INDEX: 18.31 KG/M2 | WEIGHT: 97 LBS | DIASTOLIC BLOOD PRESSURE: 73 MMHG | HEART RATE: 100 BPM

## 2017-12-27 DIAGNOSIS — M25.511 CHRONIC RIGHT SHOULDER PAIN: Primary | ICD-10-CM

## 2017-12-27 DIAGNOSIS — G89.29 CHRONIC RIGHT SHOULDER PAIN: Primary | ICD-10-CM

## 2017-12-27 DIAGNOSIS — M62.838 NECK MUSCLE SPASM: ICD-10-CM

## 2017-12-27 DIAGNOSIS — G47.00 INSOMNIA, UNSPECIFIED TYPE: ICD-10-CM

## 2017-12-27 DIAGNOSIS — F32.1 MODERATE SINGLE CURRENT EPISODE OF MAJOR DEPRESSIVE DISORDER: ICD-10-CM

## 2017-12-27 DIAGNOSIS — G20.C PARKINSONISM, UNSPECIFIED PARKINSONISM TYPE: ICD-10-CM

## 2017-12-27 DIAGNOSIS — M25.511 RIGHT SHOULDER PAIN, UNSPECIFIED CHRONICITY: ICD-10-CM

## 2017-12-27 PROCEDURE — 99213 OFFICE O/P EST LOW 20 MIN: CPT | Mod: S$GLB,,, | Performed by: FAMILY MEDICINE

## 2017-12-27 PROCEDURE — 99999 PR PBB SHADOW E&M-EST. PATIENT-LVL III: CPT | Mod: PBBFAC,,, | Performed by: FAMILY MEDICINE

## 2017-12-27 RX ORDER — GABAPENTIN 300 MG/1
300 CAPSULE ORAL 3 TIMES DAILY
Qty: 90 CAPSULE | Refills: 11 | Status: SHIPPED | OUTPATIENT
Start: 2017-12-27 | End: 2017-12-27 | Stop reason: SDUPTHER

## 2017-12-27 RX ORDER — PANTOPRAZOLE SODIUM 40 MG/1
40 TABLET, DELAYED RELEASE ORAL DAILY
Qty: 90 TABLET | Refills: 3 | Status: ON HOLD | OUTPATIENT
Start: 2017-12-27 | End: 2018-02-27

## 2017-12-27 RX ORDER — LEVOCETIRIZINE DIHYDROCHLORIDE 5 MG/1
5 TABLET, FILM COATED ORAL NIGHTLY
Qty: 90 TABLET | Refills: 3 | Status: ON HOLD | OUTPATIENT
Start: 2017-12-27 | End: 2018-02-27

## 2017-12-27 RX ORDER — GABAPENTIN 300 MG/1
300 CAPSULE ORAL 3 TIMES DAILY
Qty: 270 CAPSULE | Refills: 3 | Status: SHIPPED | OUTPATIENT
Start: 2017-12-27 | End: 2018-03-26 | Stop reason: SDUPTHER

## 2017-12-27 RX ORDER — CARBIDOPA 25 MG/1
25 TABLET ORAL 4 TIMES DAILY
Qty: 360 TABLET | Refills: 3 | Status: ON HOLD | OUTPATIENT
Start: 2017-12-27 | End: 2018-02-27 | Stop reason: ALTCHOICE

## 2017-12-27 RX ORDER — CARBIDOPA 25 MG/1
25 TABLET ORAL 4 TIMES DAILY
COMMUNITY
End: 2017-12-27 | Stop reason: SDUPTHER

## 2017-12-27 RX ORDER — DULOXETIN HYDROCHLORIDE 30 MG/1
CAPSULE, DELAYED RELEASE ORAL
Qty: 270 CAPSULE | Refills: 3 | Status: SHIPPED | OUTPATIENT
Start: 2017-12-27 | End: 2018-01-24 | Stop reason: SDUPTHER

## 2017-12-27 RX ORDER — TEMAZEPAM 30 MG/1
30 CAPSULE ORAL NIGHTLY PRN
Qty: 90 CAPSULE | Refills: 1 | Status: SHIPPED | OUTPATIENT
Start: 2017-12-27 | End: 2018-01-08 | Stop reason: SDUPTHER

## 2017-12-27 RX ORDER — BUTALBITAL, ACETAMINOPHEN AND CAFFEINE 50; 325; 40 MG/1; MG/1; MG/1
1 TABLET ORAL EVERY 4 HOURS PRN
Qty: 40 TABLET | Refills: 0 | Status: SHIPPED | OUTPATIENT
Start: 2017-12-27 | End: 2018-01-26

## 2017-12-27 RX ORDER — CARBIDOPA AND LEVODOPA 25; 100 MG/1; MG/1
1 TABLET, EXTENDED RELEASE ORAL 4 TIMES DAILY
Qty: 450 TABLET | Refills: 3 | Status: SHIPPED | OUTPATIENT
Start: 2017-12-27 | End: 2018-03-09 | Stop reason: SDUPTHER

## 2017-12-27 RX ORDER — DICLOFENAC SODIUM 10 MG/G
2 GEL TOPICAL 4 TIMES DAILY PRN
Qty: 300 G | Refills: 3 | Status: SHIPPED | OUTPATIENT
Start: 2017-12-27 | End: 2018-10-31

## 2017-12-27 RX ORDER — MEGESTROL ACETATE 40 MG/ML
400 SUSPENSION ORAL DAILY
Qty: 900 ML | Refills: 3 | Status: ON HOLD | OUTPATIENT
Start: 2017-12-27 | End: 2018-02-27

## 2017-12-27 RX ORDER — ESTRADIOL 2 MG/1
SYSTEM VAGINAL
Qty: 1 EACH | Refills: 3 | Status: SHIPPED | OUTPATIENT
Start: 2017-12-27 | End: 2018-11-01

## 2017-12-27 RX ORDER — DULOXETIN HYDROCHLORIDE 30 MG/1
CAPSULE, DELAYED RELEASE ORAL
Qty: 90 CAPSULE | Refills: 11 | Status: SHIPPED | OUTPATIENT
Start: 2017-12-27 | End: 2017-12-27 | Stop reason: SDUPTHER

## 2017-12-27 NOTE — PROGRESS NOTES
"Subjective:      Patient ID: Awilda Segovia is a 62 y.o. female.    Chief Complaint: Follow-up    HPIshe continues to have pain in the right shoulder and trapezius area.  She has not been able to tolerate the hydrocodone much and the injection that Juanjo did did not help her.  She has had no numbness in the arm but she has had right neck spasm.  She continues to have depressive symptoms and she wishes to be out of her misery.  Counseling was done and we discussed medicine options.    Review of Systems    Objective:   /73   Pulse 100 Comment: Manual  Temp 98.3 °F (36.8 °C) (Oral)   Ht 5' 1" (1.549 m)   Wt 44 kg (97 lb)   BMI 18.33 kg/m²     Physical Exam   Constitutional: She appears cachectic. She is active and cooperative. She has a sickly appearance.   HENT:   Head: Normocephalic and atraumatic.   Eyes: EOM are normal. Pupils are equal, round, and reactive to light.   Neck: Tracheal tenderness present. Decreased range of motion present. No thyromegaly present.       Cardiovascular: Normal rate, regular rhythm and normal heart sounds.    Pulmonary/Chest: Effort normal and breath sounds normal. No respiratory distress. She has no wheezes. She has no rales.   Abdominal: Soft. Bowel sounds are normal.   Musculoskeletal: She exhibits no edema.   Lymphadenopathy:     She has no cervical adenopathy.   Neurological: She is alert.   Skin: Skin is warm.       Assessment:     1. Chronic right shoulder pain    2. Neck muscle spasm    3. Moderate single current episode of major depressive disorder        Plan:   Awilda was seen today for follow-up.    Diagnoses and all orders for this visit:    Chronic right shoulder pain  -     MRI Upper Extremity Joint Without Contrast Right; Future  -     Ambulatory referral to Orthopedics  -     DULoxetine (CYMBALTA) 30 MG capsule; Take 3 po q day    Neck muscle spasm  -     Ambulatory referral to Orthopedics    Moderate single current episode of major depressive disorder  -     " DULoxetine (CYMBALTA) 30 MG capsule; Take 3 po q day    Insomnia, unspecified type  -     temazepam (RESTORIL) 30 mg capsule; Take 1 capsule (30 mg total) by mouth nightly as needed for Insomnia.    Parkinsonism, unspecified Parkinsonism type  -     carbidopa-levodopa (RYTARY) 23.75-95 mg CpSR; Take 2 capsules by mouth 4 (four) times daily.  -     carbidopa-levodopa  mg (SINEMET CR)  mg TbSR; Take 1 tablet by mouth 4 (four) times daily. Take 2 tablets at 7am, then 1 tab at noon, 6pm and 9:30pm    Right shoulder pain, unspecified chronicity  -     diclofenac sodium (VOLTAREN) 1 % Gel; Apply 2 g topically 4 (four) times daily as needed.    Other orders  -     butalbital-acetaminophen-caffeine -40 mg (FIORICET, ESGIC) -40 mg per tablet; Take 1 tablet by mouth every 4 (four) hours as needed for Pain.  -     carbidopa (LODOSYN) 25 mg tablet; Take 1 tablet (25 mg total) by mouth 4 (four) times daily.  -     gabapentin (NEURONTIN) 300 MG capsule; Take 1 capsule (300 mg total) by mouth 3 (three) times daily.  -     levocetirizine (XYZAL) 5 MG tablet; Take 1 tablet (5 mg total) by mouth every evening.  -     megestrol (MEGACE) 400 mg/10 mL (40 mg/mL) Susp; Take 10 mLs (400 mg total) by mouth once daily.  -     pantoprazole (PROTONIX) 40 MG tablet; Take 1 tablet (40 mg total) by mouth once daily.  -     ESTRING 2 mg (7.5 mcg /24 hour) vaginal ring; INSERT 1 VAGINAL RING(S) EVERY 3 MONTHS

## 2018-01-08 ENCOUNTER — TELEPHONE (OUTPATIENT)
Dept: FAMILY MEDICINE | Facility: CLINIC | Age: 63
End: 2018-01-08

## 2018-01-08 DIAGNOSIS — G47.00 INSOMNIA, UNSPECIFIED TYPE: ICD-10-CM

## 2018-01-08 RX ORDER — TEMAZEPAM 30 MG/1
30 CAPSULE ORAL NIGHTLY PRN
Qty: 90 CAPSULE | Refills: 1 | Status: SHIPPED | OUTPATIENT
Start: 2018-01-08 | End: 2018-01-19 | Stop reason: SDUPTHER

## 2018-01-08 NOTE — TELEPHONE ENCOUNTER
----- Message from Ysabel Wood sent at 1/8/2018 12:10 PM CST -----  Contact: pt-  [phone]  Pt has run out of medication.  Pt needs emergency refill.  Temazpam 30 mg    Pharmacy-  [facility]

## 2018-01-08 NOTE — TELEPHONE ENCOUNTER
Call and tell her.       I have signed for the following orders AND/OR meds.  Please call the patient and ask the patient to schedule the testing AND/OR inform about any medications that were sent.      No orders of the defined types were placed in this encounter.        Medications Ordered This Encounter      temazepam (RESTORIL) 30 mg capsule          Sig: Take 1 capsule (30 mg total) by mouth nightly as needed for Insomnia.          Dispense:  90 capsule          Refill:  1

## 2018-01-09 ENCOUNTER — HOSPITAL ENCOUNTER (OUTPATIENT)
Dept: RADIOLOGY | Facility: HOSPITAL | Age: 63
Discharge: HOME | End: 2018-01-09
Attending: FAMILY MEDICINE
Payer: COMMERCIAL

## 2018-01-09 DIAGNOSIS — M25.511 CHRONIC RIGHT SHOULDER PAIN: ICD-10-CM

## 2018-01-09 DIAGNOSIS — G89.29 CHRONIC RIGHT SHOULDER PAIN: ICD-10-CM

## 2018-01-09 PROCEDURE — 73221 MRI JOINT UPR EXTREM W/O DYE: CPT | Mod: 26,RT,, | Performed by: RADIOLOGY

## 2018-01-09 PROCEDURE — 73221 MRI JOINT UPR EXTREM W/O DYE: CPT | Mod: TC,PO,RT

## 2018-01-10 NOTE — PROGRESS NOTES
Due to motion, all that could be seen was an effusion of the shoulder.  Based on this, please arrange for her to see an orthopedist for further recommendations.

## 2018-01-19 DIAGNOSIS — G20.C PARKINSONISM, UNSPECIFIED PARKINSONISM TYPE: ICD-10-CM

## 2018-01-19 DIAGNOSIS — G47.00 INSOMNIA, UNSPECIFIED TYPE: ICD-10-CM

## 2018-01-19 RX ORDER — TEMAZEPAM 30 MG/1
30 CAPSULE ORAL NIGHTLY PRN
Qty: 90 CAPSULE | Refills: 1 | Status: SHIPPED | OUTPATIENT
Start: 2018-01-19 | End: 2018-03-26 | Stop reason: SDUPTHER

## 2018-01-22 ENCOUNTER — TELEPHONE (OUTPATIENT)
Dept: PHARMACY | Facility: CLINIC | Age: 63
End: 2018-01-22

## 2018-01-24 DIAGNOSIS — M25.511 CHRONIC RIGHT SHOULDER PAIN: ICD-10-CM

## 2018-01-24 DIAGNOSIS — F32.1 MODERATE SINGLE CURRENT EPISODE OF MAJOR DEPRESSIVE DISORDER: ICD-10-CM

## 2018-01-24 DIAGNOSIS — G89.29 CHRONIC RIGHT SHOULDER PAIN: ICD-10-CM

## 2018-01-24 RX ORDER — TRAMADOL HYDROCHLORIDE 50 MG/1
50 TABLET ORAL 4 TIMES DAILY
COMMUNITY
End: 2018-01-24 | Stop reason: SDUPTHER

## 2018-01-24 RX ORDER — TRAMADOL HYDROCHLORIDE 50 MG/1
50 TABLET ORAL 4 TIMES DAILY
Qty: 360 TABLET | Refills: 1 | Status: SHIPPED | OUTPATIENT
Start: 2018-01-24

## 2018-01-24 RX ORDER — DULOXETIN HYDROCHLORIDE 30 MG/1
CAPSULE, DELAYED RELEASE ORAL
Qty: 30 CAPSULE | Refills: 11 | Status: SHIPPED | OUTPATIENT
Start: 2018-01-24 | End: 2018-02-17 | Stop reason: SDUPTHER

## 2018-01-24 RX ORDER — TRAMADOL HYDROCHLORIDE 50 MG/1
50 TABLET ORAL 4 TIMES DAILY
Qty: 480 TABLET | Refills: 0 | Status: CANCELLED | OUTPATIENT
Start: 2018-01-24

## 2018-01-24 RX ORDER — CYCLOBENZAPRINE HCL 5 MG
5 TABLET ORAL 3 TIMES DAILY PRN
Refills: 5 | COMMUNITY
Start: 2018-01-15 | End: 2018-03-26

## 2018-01-24 RX ORDER — GABAPENTIN 100 MG/1
CAPSULE ORAL
Refills: 11 | COMMUNITY
Start: 2018-01-13 | End: 2018-03-26

## 2018-01-24 RX ORDER — DULOXETIN HYDROCHLORIDE 60 MG/1
CAPSULE, DELAYED RELEASE ORAL
Qty: 30 CAPSULE | Refills: 11 | Status: SHIPPED | OUTPATIENT
Start: 2018-01-24 | End: 2018-02-17 | Stop reason: SDUPTHER

## 2018-01-24 NOTE — TELEPHONE ENCOUNTER
The pharmacy requested and I sent in a 60 and a 30 mg cymbalta.  Would the patient prefer if we just used 3 of the 30 mg cymbaltas and just have one prescription?

## 2018-01-29 ENCOUNTER — TELEPHONE (OUTPATIENT)
Dept: FAMILY MEDICINE | Facility: CLINIC | Age: 63
End: 2018-01-29

## 2018-01-29 DIAGNOSIS — N89.8 VAGINAL DISCHARGE: Primary | ICD-10-CM

## 2018-01-29 RX ORDER — FLUCONAZOLE 150 MG/1
150 TABLET ORAL ONCE
Qty: 1 TABLET | Refills: 0 | Status: SHIPPED | OUTPATIENT
Start: 2018-01-29 | End: 2018-01-29

## 2018-01-29 NOTE — TELEPHONE ENCOUNTER
----- Message from Bonnie Davis sent at 1/29/2018  1:59 PM CST -----  Contact: pt nurse Cyndi saravia/kori  Please call back at [phone] in regards to pt having  A vaginal discharge.

## 2018-01-29 NOTE — TELEPHONE ENCOUNTER
Spoke with Cyndi saravia/hospice she states she went today to change pt's Estrogen ring and pt had a copious amount of white, milky discharge including mucus and dry blood.

## 2018-01-31 ENCOUNTER — OFFICE VISIT (OUTPATIENT)
Dept: FAMILY MEDICINE | Facility: CLINIC | Age: 63
End: 2018-01-31
Payer: COMMERCIAL

## 2018-01-31 VITALS
SYSTOLIC BLOOD PRESSURE: 115 MMHG | HEIGHT: 62 IN | HEART RATE: 133 BPM | DIASTOLIC BLOOD PRESSURE: 68 MMHG | BODY MASS INDEX: 21.16 KG/M2 | TEMPERATURE: 98 F | WEIGHT: 115 LBS

## 2018-01-31 DIAGNOSIS — N39.0 URINARY TRACT INFECTION WITHOUT HEMATURIA, SITE UNSPECIFIED: Primary | ICD-10-CM

## 2018-01-31 DIAGNOSIS — N89.8 VAGINAL DISCHARGE: ICD-10-CM

## 2018-01-31 DIAGNOSIS — N94.9 VAGINAL BURNING: ICD-10-CM

## 2018-01-31 LAB
AMORPH CRY URNS QL MICRO: ABNORMAL
BACTERIA #/AREA URNS HPF: ABNORMAL /HPF
BACTERIA GENITAL QL WET PREP: ABNORMAL
BILIRUB UR QL STRIP: ABNORMAL
CLARITY UR: ABNORMAL
CLUE CELLS VAG QL WET PREP: ABNORMAL
COLOR UR: YELLOW
FILAMENT FUNGI VAG WET PREP-#/AREA: ABNORMAL
GLUCOSE UR QL STRIP: NEGATIVE
HGB UR QL STRIP: ABNORMAL
HYALINE CASTS #/AREA URNS LPF: 0 /LPF
KETONES UR QL STRIP: ABNORMAL
LEUKOCYTE ESTERASE UR QL STRIP: ABNORMAL
MICROSCOPIC COMMENT: ABNORMAL
NITRITE UR QL STRIP: NEGATIVE
PH UR STRIP: 7 [PH] (ref 5–8)
PROT UR QL STRIP: ABNORMAL
RBC #/AREA URNS HPF: 10 /HPF (ref 0–4)
SP GR UR STRIP: 1.01 (ref 1–1.03)
SPECIMEN SOURCE: ABNORMAL
SQUAMOUS #/AREA URNS HPF: 15 /HPF
T VAGINALIS GENITAL QL WET PREP: ABNORMAL
TRI-PHOS CRY URNS QL MICRO: ABNORMAL
URN SPEC COLLECT METH UR: ABNORMAL
WBC #/AREA URNS HPF: >100 /HPF (ref 0–5)
WBC #/AREA VAG WET PREP: ABNORMAL
WBC CLUMPS URNS QL MICRO: ABNORMAL
YEAST GENITAL QL WET PREP: ABNORMAL

## 2018-01-31 PROCEDURE — 99213 OFFICE O/P EST LOW 20 MIN: CPT | Mod: S$GLB,,, | Performed by: NURSE PRACTITIONER

## 2018-01-31 PROCEDURE — 87210 SMEAR WET MOUNT SALINE/INK: CPT | Mod: PO

## 2018-01-31 PROCEDURE — 87077 CULTURE AEROBIC IDENTIFY: CPT

## 2018-01-31 PROCEDURE — 3008F BODY MASS INDEX DOCD: CPT | Mod: S$GLB,,, | Performed by: NURSE PRACTITIONER

## 2018-01-31 PROCEDURE — 99999 PR PBB SHADOW E&M-EST. PATIENT-LVL IV: CPT | Mod: PBBFAC,,, | Performed by: NURSE PRACTITIONER

## 2018-01-31 PROCEDURE — 81000 URINALYSIS NONAUTO W/SCOPE: CPT | Mod: PO

## 2018-01-31 PROCEDURE — 87086 URINE CULTURE/COLONY COUNT: CPT

## 2018-01-31 PROCEDURE — 87186 SC STD MICRODIL/AGAR DIL: CPT

## 2018-01-31 PROCEDURE — 87088 URINE BACTERIA CULTURE: CPT

## 2018-01-31 RX ORDER — PHENAZOPYRIDINE HYDROCHLORIDE 100 MG/1
100 TABLET, FILM COATED ORAL 2 TIMES DAILY PRN
Qty: 6 TABLET | Refills: 0 | Status: SHIPPED | OUTPATIENT
Start: 2018-01-31 | End: 2018-02-03

## 2018-01-31 RX ORDER — NITROFURANTOIN 25; 75 MG/1; MG/1
100 CAPSULE ORAL 2 TIMES DAILY
Qty: 20 CAPSULE | Refills: 0 | Status: SHIPPED | OUTPATIENT
Start: 2018-01-31 | End: 2018-02-05

## 2018-01-31 NOTE — PROGRESS NOTES
Subjective:       Patient ID: Awilda Segovia is a 62 y.o. female.    Chief Complaint: Vaginitis    Female  Problem   The patient's primary symptoms include vaginal discharge. The patient's pertinent negatives include no genital itching, genital lesions, genital odor, genital rash, missed menses, pelvic pain or vaginal bleeding. Primary symptoms comment: vaginal burning. This is a new problem. The current episode started in the past 7 days. The problem occurs daily. The problem has been unchanged. The pain is mild (burning). She is not pregnant. Associated symptoms include dysuria. Pertinent negatives include no abdominal pain, anorexia, back pain, chills, constipation, diarrhea, discolored urine, fever, flank pain, frequency, headaches, hematuria, joint pain, joint swelling, nausea, painful intercourse, rash, sore throat, urgency or vomiting. The vaginal discharge was thin and white. There has been no bleeding. She has not been passing clots. She has not been passing tissue. Nothing aggravates the symptoms. Treatments tried: diflucan. The treatment provided no relief. Her sexual activity is non-contributory to the current illness. No, her partner does not have an STD. She uses nothing for contraception. She is postmenopausal. There is no history of an abdominal surgery, a  section, an ectopic pregnancy, endometriosis, a gynecological surgery, herpes simplex, menorrhagia, metrorrhagia, miscarriage, ovarian cysts, perineal abscess, PID, an STD, a terminated pregnancy or vaginosis.     Past Medical History:   Diagnosis Date    H/O hysterectomy for benign disease     Menopause present     Osteopenia     Parkinson disease 2015    Sprain of left foot          Social History     Social History    Marital status:      Spouse name: N/A    Number of children: N/A    Years of education: N/A     Occupational History    Not on file.     Social History Main Topics    Smoking status: Never Smoker     Smokeless tobacco: Never Used    Alcohol use 0.6 oz/week     1 Glasses of wine per week      Comment: social    Drug use: No    Sexual activity: Yes     Partners: Male     Social History Narrative    Works for [employer]     Past Surgical History:   Procedure Laterality Date    APPENDECTOMY      CHOLECYSTECTOMY      HYSTERECTOMY      TONSILLECTOMY, ADENOIDECTOMY         Review of Systems   Constitutional: Negative.  Negative for chills and fever.   HENT: Negative.  Negative for sore throat.    Eyes: Negative.    Respiratory: Negative.    Cardiovascular: Negative.    Gastrointestinal: Negative.  Negative for abdominal pain, anorexia, constipation, diarrhea, nausea and vomiting.   Endocrine: Negative.    Genitourinary: Positive for dysuria and vaginal discharge. Negative for flank pain, frequency, hematuria, menorrhagia, missed menses, pelvic pain and urgency.   Musculoskeletal: Negative.  Negative for back pain and joint pain.   Skin: Negative.  Negative for rash.   Allergic/Immunologic: Negative.    Neurological: Negative.  Negative for headaches.   Psychiatric/Behavioral: Negative.        Objective:      Physical Exam   Constitutional: She is oriented to person, place, and time. She appears well-developed and well-nourished.   HENT:   Head: Normocephalic.   Right Ear: External ear normal.   Left Ear: External ear normal.   Nose: Nose normal.   Mouth/Throat: Oropharynx is clear and moist.   Eyes: Conjunctivae are normal. Pupils are equal, round, and reactive to light.   Neck: Normal range of motion. Neck supple.   Cardiovascular: Normal rate, regular rhythm and normal heart sounds.    Pulmonary/Chest: Effort normal and breath sounds normal.   Abdominal: Soft. Bowel sounds are normal.   Genitourinary: Pelvic exam was performed with patient supine. No labial fusion. There is no rash, tenderness, lesion or injury on the right labia. There is no rash, tenderness, lesion or injury on the left labia. No  erythema, tenderness or bleeding in the vagina. No foreign body in the vagina. No signs of injury around the vagina. Vaginal discharge (Small amount thin, yellow discharge) found.   Neurological: She is alert and oriented to person, place, and time.   Skin: Skin is warm and dry. Capillary refill takes 2 to 3 seconds.   Psychiatric: She has a normal mood and affect. Her behavior is normal. Judgment and thought content normal.   Nursing note and vitals reviewed.      Assessment:       1. Vaginal discharge    2. Vaginal burning    3.      Urinary tract infection without hematuria, site unspecified     Plan:           Awilda was seen today for vaginitis.    Diagnoses and all orders for this visit:  Urinary tract infection without hematuria, site unspecified  Vaginal discharge  Vaginal burning  -     Vaginal Screen Vagina; Future  -     Vaginal Screen Vagina  -     Urinalysis  -     Urine culture  -     Urinalysis Microscopic  -     nitrofurantoin, macrocrystal-monohydrate, (MACROBID) 100 MG capsule; Take 1 capsule (100 mg total) by mouth 2 (two) times daily.  -     phenazopyridine (PYRIDIUM) 100 MG tablet; Take 1 tablet (100 mg total) by mouth 2 (two) times daily as needed.

## 2018-02-02 DIAGNOSIS — M25.511 RIGHT SHOULDER PAIN, UNSPECIFIED CHRONICITY: Primary | ICD-10-CM

## 2018-02-03 LAB — BACTERIA UR CULT: NORMAL

## 2018-02-05 ENCOUNTER — OFFICE VISIT (OUTPATIENT)
Dept: ORTHOPEDICS | Facility: CLINIC | Age: 63
End: 2018-02-05
Payer: COMMERCIAL

## 2018-02-05 ENCOUNTER — HOSPITAL ENCOUNTER (OUTPATIENT)
Dept: RADIOLOGY | Facility: HOSPITAL | Age: 63
Discharge: HOME | End: 2018-02-05
Attending: ORTHOPAEDIC SURGERY
Payer: COMMERCIAL

## 2018-02-05 VITALS
DIASTOLIC BLOOD PRESSURE: 77 MMHG | HEART RATE: 117 BPM | HEIGHT: 62 IN | BODY MASS INDEX: 21.17 KG/M2 | WEIGHT: 115.06 LBS | SYSTOLIC BLOOD PRESSURE: 132 MMHG

## 2018-02-05 DIAGNOSIS — M25.511 RIGHT SHOULDER PAIN, UNSPECIFIED CHRONICITY: ICD-10-CM

## 2018-02-05 DIAGNOSIS — M25.519 SHOULDER PAIN, UNSPECIFIED CHRONICITY, UNSPECIFIED LATERALITY: Primary | ICD-10-CM

## 2018-02-05 DIAGNOSIS — G20.C PARKINSONISM, UNSPECIFIED PARKINSONISM TYPE: ICD-10-CM

## 2018-02-05 DIAGNOSIS — M75.01 ADHESIVE CAPSULITIS OF RIGHT SHOULDER: Primary | ICD-10-CM

## 2018-02-05 PROCEDURE — 99999 PR PBB SHADOW E&M-EST. PATIENT-LVL III: CPT | Mod: PBBFAC,,, | Performed by: ORTHOPAEDIC SURGERY

## 2018-02-05 PROCEDURE — 73030 X-RAY EXAM OF SHOULDER: CPT | Mod: TC,PO,RT

## 2018-02-05 PROCEDURE — 73030 X-RAY EXAM OF SHOULDER: CPT | Mod: 26,RT,, | Performed by: RADIOLOGY

## 2018-02-05 PROCEDURE — 3008F BODY MASS INDEX DOCD: CPT | Mod: S$GLB,,, | Performed by: ORTHOPAEDIC SURGERY

## 2018-02-05 PROCEDURE — 99202 OFFICE O/P NEW SF 15 MIN: CPT | Mod: S$GLB,,, | Performed by: ORTHOPAEDIC SURGERY

## 2018-02-05 RX ORDER — AMOXICILLIN AND CLAVULANATE POTASSIUM 875; 125 MG/1; MG/1
1 TABLET, FILM COATED ORAL EVERY 12 HOURS
Qty: 20 TABLET | Refills: 0 | Status: SHIPPED | OUTPATIENT
Start: 2018-02-05 | End: 2018-02-15

## 2018-02-05 NOTE — LETTER
February 5, 2018      Gato Ivan MD  [address] [address]           [facility]  [address] [address]  Phone: [phone]          Patient: Awilda Segovia   MR Number: [id number]   YOB: 1955   Date of Visit: 2/5/2018       Dear Dr. Gato Ivan:    Thank you for referring Awilda Segovia to me for evaluation. Attached you will find relevant portions of my assessment and plan of care.    If you have questions, please do not hesitate to call me. I look forward to following Awilda Segovia along with you.    Sincerely,    Saul Sharma MD    Enclosure  CC:  No Recipients    If you would like to receive this communication electronically, please contact [email or web address] or [phone] to request more information on [employer] access.    For providers and/or their staff who would like to refer a patient to [facility], please contact us through our one-stop-shop provider referral line, [facility] [place], at [phone].    If you feel you have received this communication in error or would no longer like to receive these types of communications, please e-mail [email or web address]

## 2018-02-05 NOTE — PROGRESS NOTES
Subjective:     Patient ID: Awilda Segovia is a 62 y.o. female.    Chief Complaint: Pain of the Right Shoulder    Ms. Segovia is here with her  today, Dr. Segovia. She has been battling Parkinson's for the past 3 years. She has had pain and decreased ROM to the right shoulder for the past 6 months. She has tried NSAIDs without releif. She has trailed different tramadol and pain management regimens with some relief. She has had 2 subacromial injections without relief. She may have had trigger point injections. Right now her right shoulder pain is fairly severe and affecting her quality of life tremendously.      Shoulder Pain    The pain is present in the right shoulder. The pain radiates to the right arm. This is a recurrent problem. The current episode started more than 1 year ago (pain for about 3 years). There has been no history of extremity trauma. Movement associated with injury: none.The problem occurs constantly. The problem has been unchanged. The quality of the pain is described as aching. The pain is at a severity of 7/10. Associated symptoms include an inability to bear weight, a limited range of motion, numbness and stiffness. Pertinent negatives include no fever or itching. She has tried other (Cream) for the symptoms. The treatment provided no relief. Physical therapy was ineffective.      Past Medical History:   Diagnosis Date    H/O hysterectomy for benign disease     Menopause present     Osteopenia     Parkinson disease 12/2015    Sprain of left foot     2008     Past Surgical History:   Procedure Laterality Date    APPENDECTOMY      CHOLECYSTECTOMY      HYSTERECTOMY      TONSILLECTOMY, ADENOIDECTOMY       Family History   Problem Relation Age of Onset    Diabetes Mother     Cataracts Mother     Hypertension Mother     Cancer Maternal Aunt      breast    Cancer Maternal Uncle      brain , bone     Cancer Paternal Aunt      uterine    Diabetes Maternal Grandfather     Cataracts  Father      Social History     Social History    Marital status:      Spouse name: N/A    Number of children: N/A    Years of education: N/A     Occupational History    Not on file.     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    CARBIDOPA (LODOSYN) 25 MG TABLET    Take 1 tablet (25 mg total) by mouth 4 (four) times daily.    CARBIDOPA-LEVODOPA (RYTARY) 23.75-95 MG CPSR    Take 2 capsules by mouth 4 (four) times daily.    CARBIDOPA-LEVODOPA  MG (SINEMET CR)  MG TBSR    Take 1 tablet by mouth 4 (four) times daily. Take 2 tablets at 7am, then 1 tab at noon, 6pm and 9:30pm    CYCLOBENZAPRINE (FLEXERIL) 5 MG TABLET    Take 5 mg by mouth 3 (three) times daily as needed.    DICLOFENAC SODIUM (VOLTAREN) 1 % GEL    Apply 2 g topically 4 (four) times daily as needed.    DULOXETINE (CYMBALTA) 30 MG CAPSULE    TAKE ONE CAPSULE BY MOUTH EVERY DAY. TAKE W/60 MG CAPSULE    DULOXETINE (CYMBALTA) 60 MG CAPSULE    TAKE ONE CAPSULE BY MOUTH EVERY DAY TAKE WITH 30/MG CAPSULE    ESTRING 2 MG (7.5 MCG /24 HOUR) VAGINAL RING    INSERT 1 VAGINAL RING(S) EVERY 3 MONTHS    GABAPENTIN (NEURONTIN) 100 MG CAPSULE    TAKE 1-2 CAPSULES EVERY EVENING    GABAPENTIN (NEURONTIN) 300 MG CAPSULE    Take 1 capsule (300 mg total) by mouth 3 (three) times daily.    LEVOCETIRIZINE (XYZAL) 5 MG TABLET    Take 1 tablet (5 mg total) by mouth every evening.    MEGESTROL (MEGACE) 400 MG/10 ML (40 MG/ML) SUSP    Take 10 mLs (400 mg total) by mouth once daily.     PANTOPRAZOLE (PROTONIX) 40 MG TABLET    Take 1 tablet (40 mg total) by mouth once daily.    TEMAZEPAM (RESTORIL) 30 MG CAPSULE    Take 1 capsule (30 mg total) by mouth nightly as needed for Insomnia.    TRAMADOL (ULTRAM) 50 MG TABLET    Take 1 tablet (50 mg total) by mouth 4 (four) times daily.     Review of patient's allergies indicates:   Allergen Reactions    Silver nitrate Anaphylaxis     Put wholes skin     Demerol [meperidine]      Can worsen PD    Reglan [metoclopramide hcl]      Can worsen PD    Risperdal [risperidone]      Can worsen PD     Review of Systems   Constitution: Negative for fever.   HENT: Negative for sore throat.    Eyes: Negative for blurred vision.   Cardiovascular: Negative for dyspnea on exertion.   Respiratory: Negative for shortness of breath.    Hematologic/Lymphatic: Does not bruise/bleed easily.   Skin: Negative for itching.   Musculoskeletal: Positive for falls, joint pain, muscle weakness, neck pain and stiffness.   Gastrointestinal: Negative for vomiting.   Genitourinary: Negative for dysuria.   Neurological: Positive for dizziness and numbness.   Psychiatric/Behavioral: The patient does not have insomnia.        Objective:   Body mass index is 21.05 kg/m².  Vitals:    02/05/18 1110   BP: 132/77   Pulse: (!) 117                   Right Shoulder Exam     Tenderness   Right shoulder tenderness location: mild tenderness biceps tendon in groove.    Range of Motion   Active Abduction:  70 (passively can only correct to 75) abnormal   Forward Flexion:  90 (passively i can only correct her to 110) abnormal   External Rotation 0 degrees:  10 (passively only to 15) abnormal   Internal Rotation 0 degrees:  Sacrum abnormal     Tests & Signs   Hawkin's test: positive  Impingement: positive  Active Compression Test ([place]'s Sign): positive    Muscle Strength   Right Upper Extremity   Shoulder Internal Rotation: 4/5 (4+/5 with pain)   Shoulder External Rotation: 4/5   Supraspinatus: 4/5  (4+/5 with pain)/5   Subscapularis: 5/5/5     Vascular Exam     Right Pulses      Radial:                    2+          IMAGING 4 views of the right shoulder ordered and interpreted by me today show minimal AC joint DJD and really no significant glenohumeral degenerative change.    Assessment:     Encounter Diagnoses   Name Primary?    Adhesive capsulitis of right shoulder Yes    Parkinsonism, unspecified Parkinsonism type         Plan:     I told Ms. Segovia and Dr. Segovia that I am concerned about adhesive capsulitis, possible rotator cuff tear, biceps tendinitis, primary cervical pathology  I told them that her radiographs to her shoulder show minimal AC joint DJD and really no significant glenohumeral degenerative change.  Recommend an intra articular CSI R shoulder to help with ROM and possible pain relief  PT focus on stretching - Rx given today  Follow up PRN  Consider MRI of right shoulder with sedation in future, workup of cervical spine as well as I do think that this is contributing to her symptoms and she likely has pathology of both the shoulder and the cervical spine     Saul Sharma MD    Orthopaedic Surgery  [facility]

## 2018-02-09 ENCOUNTER — TELEPHONE (OUTPATIENT)
Dept: PAIN MEDICINE | Facility: CLINIC | Age: 63
End: 2018-02-09

## 2018-02-09 DIAGNOSIS — M25.511 CHRONIC RIGHT SHOULDER PAIN: Primary | ICD-10-CM

## 2018-02-09 DIAGNOSIS — G89.29 CHRONIC RIGHT SHOULDER PAIN: Primary | ICD-10-CM

## 2018-02-09 RX ORDER — SODIUM CHLORIDE, SODIUM LACTATE, POTASSIUM CHLORIDE, CALCIUM CHLORIDE 600; 310; 30; 20 MG/100ML; MG/100ML; MG/100ML; MG/100ML
INJECTION, SOLUTION INTRAVENOUS CONTINUOUS
Status: CANCELLED | OUTPATIENT
Start: 2018-02-27

## 2018-02-09 NOTE — TELEPHONE ENCOUNTER
----- Message from Lenin Dobbins MD sent at 2/8/2018 12:38 PM CST -----  Regarding: RE:  Please ask patient if she would like to be set up for the injection to be done in the surgery center or would like to see me in clinic first.    Thanks  ----- Message -----  From: Claudia Watkins LPN  Sent: 2/8/2018  10:01 AM  To: Lenin Dobbins MD  Subject: FW:                                              See message below.     ----- Message -----  From: Vilma Harper LPN  Sent: 2/8/2018   8:07 AM  To: Claudia Watkins LPN  Subject: FW:                                                  ----- Message -----  From: Saul Sharma MD  Sent: 2/8/2018   8:00 AM  To: Vilma Harper LPN  Subject: RE:                                              Fluoroscopy or ultrasound guidance. Not able to be done in clinic.  ----- Message -----  From: Vilma Harper LPN  Sent: 2/6/2018   4:42 PM  To: Saul Sharma MD    We can discuss this tomorrow in clinic :)     Thanks!   ----- Message -----  From: Claudia Watkins LPN  Sent: 2/6/2018   4:39 PM  To: Vilma Harper LPN    Please ask Dr. Sharma message below.     ----- Message -----  From: Lenin Dobbins MD  Sent: 2/5/2018   5:13 PM  To: Claudia Watkins LPN    I can do that, can you ask if he was requesting this to be done under fluoroscopy and if it was unable to be performed in office? thanks  ----- Message -----  From: Claudia Watkins LPN  Sent: 2/5/2018   5:04 PM  To: Lenin Dobbins MD    Please see message below and advise.     ----- Message -----  From: Vilma Harper LPN  Sent: 2/5/2018  12:41 PM  To: Mu Walker    Good afternoon,     Dr. Sharma would like for this pt to be scheduled for intra articular CSI R shoulder.    Please let me know if there is anything I can do to help with this process.       Thanks!

## 2018-02-09 NOTE — TELEPHONE ENCOUNTER
Spoke with patient's . Procedure scheduled for 2-27. Pre-op instructions reviewed and sent. Instructed to follow up with Dr. Freyer.

## 2018-02-17 DIAGNOSIS — M25.511 CHRONIC RIGHT SHOULDER PAIN: ICD-10-CM

## 2018-02-17 DIAGNOSIS — F32.1 MODERATE SINGLE CURRENT EPISODE OF MAJOR DEPRESSIVE DISORDER: ICD-10-CM

## 2018-02-17 DIAGNOSIS — G89.29 CHRONIC RIGHT SHOULDER PAIN: ICD-10-CM

## 2018-02-19 RX ORDER — DULOXETIN HYDROCHLORIDE 30 MG/1
CAPSULE, DELAYED RELEASE ORAL
Qty: 30 CAPSULE | Refills: 0 | Status: SHIPPED | OUTPATIENT
Start: 2018-02-19 | End: 2018-03-26 | Stop reason: SDUPTHER

## 2018-02-19 RX ORDER — DULOXETIN HYDROCHLORIDE 60 MG/1
CAPSULE, DELAYED RELEASE ORAL
Qty: 30 CAPSULE | Refills: 0 | Status: SHIPPED | OUTPATIENT
Start: 2018-02-19 | End: 2018-03-26 | Stop reason: SDUPTHER

## 2018-02-26 DIAGNOSIS — M25.511 RIGHT SHOULDER PAIN: Primary | ICD-10-CM

## 2018-02-27 ENCOUNTER — HOSPITAL ENCOUNTER (OUTPATIENT)
Dept: RADIOLOGY | Facility: HOSPITAL | Age: 63
Discharge: HOME | End: 2018-02-27
Attending: ANESTHESIOLOGY | Admitting: ANESTHESIOLOGY
Payer: COMMERCIAL

## 2018-02-27 ENCOUNTER — TELEPHONE (OUTPATIENT)
Dept: PAIN MEDICINE | Facility: CLINIC | Age: 63
End: 2018-02-27

## 2018-02-27 ENCOUNTER — SURGERY (OUTPATIENT)
Age: 63
End: 2018-02-27

## 2018-02-27 ENCOUNTER — HOSPITAL ENCOUNTER (OUTPATIENT)
Facility: HOSPITAL | Age: 63
Discharge: HOME | End: 2018-02-27
Attending: ANESTHESIOLOGY | Admitting: ANESTHESIOLOGY
Payer: COMMERCIAL

## 2018-02-27 VITALS
RESPIRATION RATE: 16 BRPM | OXYGEN SATURATION: 98 % | HEIGHT: 62 IN | SYSTOLIC BLOOD PRESSURE: 137 MMHG | TEMPERATURE: 98 F | WEIGHT: 113 LBS | DIASTOLIC BLOOD PRESSURE: 70 MMHG | HEART RATE: 119 BPM | BODY MASS INDEX: 20.8 KG/M2

## 2018-02-27 DIAGNOSIS — M25.511 RIGHT SHOULDER PAIN: ICD-10-CM

## 2018-02-27 DIAGNOSIS — M25.511 CHRONIC RIGHT SHOULDER PAIN: Primary | ICD-10-CM

## 2018-02-27 DIAGNOSIS — G89.29 CHRONIC RIGHT SHOULDER PAIN: Primary | ICD-10-CM

## 2018-02-27 PROCEDURE — 77002 NEEDLE LOCALIZATION BY XRAY: CPT | Mod: 26,,, | Performed by: ANESTHESIOLOGY

## 2018-02-27 PROCEDURE — 20610 DRAIN/INJ JOINT/BURSA W/O US: CPT | Mod: RT,,, | Performed by: ANESTHESIOLOGY

## 2018-02-27 PROCEDURE — 20610 DRAIN/INJ JOINT/BURSA W/O US: CPT | Mod: PO | Performed by: ANESTHESIOLOGY

## 2018-02-27 PROCEDURE — 76000 FLUOROSCOPY <1 HR PHYS/QHP: CPT | Mod: TC,PO

## 2018-02-27 PROCEDURE — 25000003 PHARM REV CODE 250: Mod: PO | Performed by: ANESTHESIOLOGY

## 2018-02-27 PROCEDURE — 99152 MOD SED SAME PHYS/QHP 5/>YRS: CPT | Mod: ,,, | Performed by: ANESTHESIOLOGY

## 2018-02-27 PROCEDURE — 63600175 PHARM REV CODE 636 W HCPCS: Mod: PO | Performed by: ANESTHESIOLOGY

## 2018-02-27 RX ORDER — SODIUM CHLORIDE, SODIUM LACTATE, POTASSIUM CHLORIDE, CALCIUM CHLORIDE 600; 310; 30; 20 MG/100ML; MG/100ML; MG/100ML; MG/100ML
INJECTION, SOLUTION INTRAVENOUS CONTINUOUS
Status: DISCONTINUED | OUTPATIENT
Start: 2018-02-27 | End: 2018-02-27 | Stop reason: HOSPADM

## 2018-02-27 RX ORDER — LIDOCAINE HYDROCHLORIDE 10 MG/ML
INJECTION INFILTRATION; PERINEURAL
Status: DISCONTINUED | OUTPATIENT
Start: 2018-02-27 | End: 2018-02-27 | Stop reason: HOSPADM

## 2018-02-27 RX ORDER — FENTANYL CITRATE 50 UG/ML
INJECTION, SOLUTION INTRAMUSCULAR; INTRAVENOUS
Status: DISCONTINUED | OUTPATIENT
Start: 2018-02-27 | End: 2018-02-27 | Stop reason: HOSPADM

## 2018-02-27 RX ORDER — METHYLPREDNISOLONE ACETATE 80 MG/ML
INJECTION, SUSPENSION INTRA-ARTICULAR; INTRALESIONAL; INTRAMUSCULAR; SOFT TISSUE
Status: DISCONTINUED | OUTPATIENT
Start: 2018-02-27 | End: 2018-02-27 | Stop reason: HOSPADM

## 2018-02-27 RX ORDER — BUPIVACAINE HYDROCHLORIDE 2.5 MG/ML
INJECTION, SOLUTION EPIDURAL; INFILTRATION; INTRACAUDAL
Status: DISCONTINUED | OUTPATIENT
Start: 2018-02-27 | End: 2018-02-27 | Stop reason: HOSPADM

## 2018-02-27 RX ORDER — MIDAZOLAM HYDROCHLORIDE 1 MG/ML
INJECTION INTRAMUSCULAR; INTRAVENOUS
Status: DISCONTINUED | OUTPATIENT
Start: 2018-02-27 | End: 2018-02-27 | Stop reason: HOSPADM

## 2018-02-27 RX ADMIN — METHYLPREDNISOLONE ACETATE 80 MG: 80 INJECTION, SUSPENSION INTRA-ARTICULAR; INTRALESIONAL; INTRAMUSCULAR; SOFT TISSUE at 04:02

## 2018-02-27 RX ADMIN — BUPIVACAINE HYDROCHLORIDE 2 ML: 2.5 INJECTION, SOLUTION EPIDURAL; INFILTRATION; INTRACAUDAL; PERINEURAL at 04:02

## 2018-02-27 RX ADMIN — MIDAZOLAM HYDROCHLORIDE 1 MG: 1 INJECTION, SOLUTION INTRAMUSCULAR; INTRAVENOUS at 04:02

## 2018-02-27 RX ADMIN — LIDOCAINE HYDROCHLORIDE 10 ML: 10 INJECTION, SOLUTION INFILTRATION; PERINEURAL at 04:02

## 2018-02-27 RX ADMIN — FENTANYL CITRATE 25 MCG: 50 INJECTION, SOLUTION INTRAMUSCULAR; INTRAVENOUS at 04:02

## 2018-02-27 RX ADMIN — SODIUM CHLORIDE, SODIUM LACTATE, POTASSIUM CHLORIDE, CALCIUM CHLORIDE: 600; 310; 30; 20 INJECTION, SOLUTION INTRAVENOUS at 03:02

## 2018-02-27 NOTE — DISCHARGE SUMMARY
[facility]  Discharge Note  Short Stay    Admit Date: 2/27/2018    Discharge Date: 2/27/2018    Attending Physician: Lenin Dobbins MD     Discharge Provider: Lenin Dobbins    Diagnoses:  Active Hospital Problems    Diagnosis  POA    *Chronic right shoulder pain [M25.511, G89.29]  Yes      Resolved Hospital Problems    Diagnosis Date Resolved POA   No resolved problems to display.       Discharged Condition: good    Final Diagnoses: Chronic right shoulder pain [M25.511, G89.29]    Disposition: Home or Self Care    Hospital Course: no complications, uneventful    Outcome of Hospitalization, Treatment, Procedure, or Surgery:  Patient was admitted for outpatient procedure. The patient underwent procedure without complications and are discharged home    Follow up/Patient Instructions:  Follow up as scheduled/Patient has received instructions and follow up date    Medications:  Continue previous medications      Discharge Procedure Orders  Call MD for:  temperature >100.4     Call MD for:  severe uncontrolled pain     Call MD for:  redness, tenderness, or signs of infection (pain, swelling, redness, odor or green/yellow discharge around incision site)     Call MD for:  severe persistent headache     No dressing needed           Discharge Procedure Orders (must include Diet, Follow-up, Activity):    Discharge Procedure Orders (must include Diet, Follow-up, Activity)  Call MD for:  temperature >100.4     Call MD for:  severe uncontrolled pain     Call MD for:  redness, tenderness, or signs of infection (pain, swelling, redness, odor or green/yellow discharge around incision site)     Call MD for:  severe persistent headache     No dressing needed

## 2018-02-27 NOTE — OP NOTE
PROCEDURE:  Right Shoulder joint injection under fluoroscopy.      Diagnosis: Right shoulder pain  Post Op diagnosis: Same       PHYSICIAN: Lenin Dobbins MD                                                                               Local anesthetic:  Lidocaine 1%, 1 ml total                                                                                                              MEDICATIONS INJECTED:  Methylprednisone 80mg and bupivacaine 0.25% 2 mL                               Sedation: 2mg versed, 25mcg fentanyl                                                  ESTIMATED BLOOD LOSS:  none                                                                                                                             COMPLICATIONS:  none                                                                                                                                    TECHNIQUE: A time-out taken to identify patient and procedure side prior to starting the procedure.  With the patient lying in the supine position, the right humeral head and acromion were visualized. The area was prepped and draped in the usual sterile fashion.  Local anesthetic was used, given by raising a wheal and going down to the hub of the 25-gauge needle 1.5inch needle.  A 3.5inch 22-gauge needle was introduced under fluoroscopy to the midpoint of joint capsule. When the tip of the needle was presumed to be in appropriate position, omnipaque contrast was injected and noted to spread throughout the joint space.  After negative aspiration for blood, the medication as noted above was then injected slowly.  The patient tolerated the procedure well.                                                                                                                                                                                                The patient was monitored after the procedure and was given post procedure and discharge instructions to follow  at home. The patient was discharged in a stable condition.

## 2018-02-27 NOTE — DISCHARGE INSTRUCTIONS
Home care instructions  Apply ice pack to the injection site for 20 minutes periods for the first 24 hrs for soreness/discomfort at injection site DO NOT USE HEAT FOR 24 HOURS  Keep site clean and dry for 24 hours, remove bandaid when desired  Resume home medication as prescribed today  Resume Aspirin, Plavix, or Coumadin the day after the procedure unless otherwise instructed.    SEE IMMEDIATE MEDICAL HELP FOR:  Severe increase in your usual pain or appearance of new pain  Prolonged or increasing weakness or numbness in the legs or arms  Drainage, redness, active bleeding, or increased swelling at the injection site  Temperature over 100.0 degrees F.  CALL 911 OR GO DIRECTLY TO EMERGENCY DEPARTMENT FOR:  Shortness of breath, chest pain, or problems breathing

## 2018-02-27 NOTE — INTERVAL H&P NOTE
The patient has been examined and the H&P has been reviewed:    I concur with the findings and no changes have occurred since H&P was written.    Anesthesia/Surgery risks, benefits and alternative options discussed and understood by patient/family.          Active Hospital Problems    Diagnosis  POA    Chronic right shoulder pain [M25.511, G89.29]  Yes      Resolved Hospital Problems    Diagnosis Date Resolved POA   No resolved problems to display.

## 2018-02-27 NOTE — H&P (VIEW-ONLY)
Subjective:     Patient ID: Awilda Segovia is a 62 y.o. female.    Chief Complaint: Pain of the Right Shoulder    Ms. Segovia is here with her  today, Dr. Segovia. She has been battling Parkinson's for the past 3 years. She has had pain and decreased ROM to the right shoulder for the past 6 months. She has tried NSAIDs without releif. She has trailed different tramadol and pain management regimens with some relief. She has had 2 subacromial injections without relief. She may have had trigger point injections. Right now her right shoulder pain is fairly severe and affecting her quality of life tremendously.      Shoulder Pain    The pain is present in the right shoulder. The pain radiates to the right arm. This is a recurrent problem. The current episode started more than 1 year ago (pain for about 3 years). There has been no history of extremity trauma. Movement associated with injury: none.The problem occurs constantly. The problem has been unchanged. The quality of the pain is described as aching. The pain is at a severity of 7/10. Associated symptoms include an inability to bear weight, a limited range of motion, numbness and stiffness. Pertinent negatives include no fever or itching. She has tried other (Cream) for the symptoms. The treatment provided no relief. Physical therapy was ineffective.      Past Medical History:   Diagnosis Date    H/O hysterectomy for benign disease     Menopause present     Osteopenia     Parkinson disease 12/2015    Sprain of left foot     2008     Past Surgical History:   Procedure Laterality Date    APPENDECTOMY      CHOLECYSTECTOMY      HYSTERECTOMY      TONSILLECTOMY, ADENOIDECTOMY       Family History   Problem Relation Age of Onset    Diabetes Mother     Cataracts Mother     Hypertension Mother     Cancer Maternal Aunt      breast    Cancer Maternal Uncle      brain , bone     Cancer Paternal Aunt      uterine    Diabetes Maternal Grandfather     Cataracts  Father      Social History     Social History    Marital status:      Spouse name: N/A    Number of children: N/A    Years of education: N/A     Occupational History    Not on file.     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    CARBIDOPA (LODOSYN) 25 MG TABLET    Take 1 tablet (25 mg total) by mouth 4 (four) times daily.    CARBIDOPA-LEVODOPA (RYTARY) 23.75-95 MG CPSR    Take 2 capsules by mouth 4 (four) times daily.    CARBIDOPA-LEVODOPA  MG (SINEMET CR)  MG TBSR    Take 1 tablet by mouth 4 (four) times daily. Take 2 tablets at 7am, then 1 tab at noon, 6pm and 9:30pm    CYCLOBENZAPRINE (FLEXERIL) 5 MG TABLET    Take 5 mg by mouth 3 (three) times daily as needed.    DICLOFENAC SODIUM (VOLTAREN) 1 % GEL    Apply 2 g topically 4 (four) times daily as needed.    DULOXETINE (CYMBALTA) 30 MG CAPSULE    TAKE ONE CAPSULE BY MOUTH EVERY DAY. TAKE W/60 MG CAPSULE    DULOXETINE (CYMBALTA) 60 MG CAPSULE    TAKE ONE CAPSULE BY MOUTH EVERY DAY TAKE WITH 30/MG CAPSULE    ESTRING 2 MG (7.5 MCG /24 HOUR) VAGINAL RING    INSERT 1 VAGINAL RING(S) EVERY 3 MONTHS    GABAPENTIN (NEURONTIN) 100 MG CAPSULE    TAKE 1-2 CAPSULES EVERY EVENING    GABAPENTIN (NEURONTIN) 300 MG CAPSULE    Take 1 capsule (300 mg total) by mouth 3 (three) times daily.    LEVOCETIRIZINE (XYZAL) 5 MG TABLET    Take 1 tablet (5 mg total) by mouth every evening.    MEGESTROL (MEGACE) 400 MG/10 ML (40 MG/ML) SUSP    Take 10 mLs (400 mg total) by mouth once daily.     PANTOPRAZOLE (PROTONIX) 40 MG TABLET    Take 1 tablet (40 mg total) by mouth once daily.    TEMAZEPAM (RESTORIL) 30 MG CAPSULE    Take 1 capsule (30 mg total) by mouth nightly as needed for Insomnia.    TRAMADOL (ULTRAM) 50 MG TABLET    Take 1 tablet (50 mg total) by mouth 4 (four) times daily.     Review of patient's allergies indicates:   Allergen Reactions    Silver nitrate Anaphylaxis     Put wholes skin     Demerol [meperidine]      Can worsen PD    Reglan [metoclopramide hcl]      Can worsen PD    Risperdal [risperidone]      Can worsen PD     Review of Systems   Constitution: Negative for fever.   HENT: Negative for sore throat.    Eyes: Negative for blurred vision.   Cardiovascular: Negative for dyspnea on exertion.   Respiratory: Negative for shortness of breath.    Hematologic/Lymphatic: Does not bruise/bleed easily.   Skin: Negative for itching.   Musculoskeletal: Positive for falls, joint pain, muscle weakness, neck pain and stiffness.   Gastrointestinal: Negative for vomiting.   Genitourinary: Negative for dysuria.   Neurological: Positive for dizziness and numbness.   Psychiatric/Behavioral: The patient does not have insomnia.        Objective:   Body mass index is 21.05 kg/m².  Vitals:    02/05/18 1110   BP: 132/77   Pulse: (!) 117                   Right Shoulder Exam     Tenderness   Right shoulder tenderness location: mild tenderness biceps tendon in groove.    Range of Motion   Active Abduction:  70 (passively can only correct to 75) abnormal   Forward Flexion:  90 (passively i can only correct her to 110) abnormal   External Rotation 0 degrees:  10 (passively only to 15) abnormal   Internal Rotation 0 degrees:  Sacrum abnormal     Tests & Signs   Hawkin's test: positive  Impingement: positive  Active Compression Test (Niagara's Sign): positive    Muscle Strength   Right Upper Extremity   Shoulder Internal Rotation: 4/5 (4+/5 with pain)   Shoulder External Rotation: 4/5   Supraspinatus: 4/5  (4+/5 with pain)/5   Subscapularis: 5/5/5     Vascular Exam     Right Pulses      Radial:                    2+          IMAGING 4 views of the right shoulder ordered and interpreted by me today show minimal AC joint DJD and really no significant glenohumeral degenerative change.    Assessment:     Encounter Diagnoses   Name Primary?    Adhesive capsulitis of right shoulder Yes    Parkinsonism, unspecified Parkinsonism type         Plan:     I told Ms. Segovia and Dr. Sgeovia that I am concerned about adhesive capsulitis, possible rotator cuff tear, biceps tendinitis, primary cervical pathology  I told them that her radiographs to her shoulder show minimal AC joint DJD and really no significant glenohumeral degenerative change.  Recommend an intra articular CSI R shoulder to help with ROM and possible pain relief  PT focus on stretching - Rx given today  Follow up PRN  Consider MRI of right shoulder with sedation in future, workup of cervical spine as well as I do think that this is contributing to her symptoms and she likely has pathology of both the shoulder and the cervical spine     Saul Sharma MD    Orthopaedic Surgery  [facility] [place]

## 2018-03-02 ENCOUNTER — TELEPHONE (OUTPATIENT)
Dept: PAIN MEDICINE | Facility: CLINIC | Age: 63
End: 2018-03-02

## 2018-03-02 NOTE — TELEPHONE ENCOUNTER
----- Message from Salvatore ENGLAND Norberto sent at 3/2/2018  3:12 PM CST -----  Contact: /Dr. Segovia  Unsuccessful call placed to office.  Dr. Segovia called in regarding the attached patient (wife) and would like to scheduled a 2 week f/u, per Dr. Dobbins.  Patients procedure was done on 2/27/18 in [place].  Next available appt was not until 4/10/18.  Dr. Segovia's call back number is [phone] cell

## 2018-03-08 ENCOUNTER — OFFICE VISIT (OUTPATIENT)
Dept: NEUROLOGY | Facility: CLINIC | Age: 63
End: 2018-03-08
Payer: COMMERCIAL

## 2018-03-08 VITALS
HEIGHT: 61 IN | DIASTOLIC BLOOD PRESSURE: 69 MMHG | BODY MASS INDEX: 19.39 KG/M2 | SYSTOLIC BLOOD PRESSURE: 121 MMHG | WEIGHT: 102.69 LBS | HEART RATE: 123 BPM

## 2018-03-08 DIAGNOSIS — G20.C PRIMARY PARKINSONISM: Primary | ICD-10-CM

## 2018-03-08 DIAGNOSIS — T42.8X5A LEVODOPA-INDUCED DYSKINESIA: ICD-10-CM

## 2018-03-08 DIAGNOSIS — F48.2 PBA (PSEUDOBULBAR AFFECT): ICD-10-CM

## 2018-03-08 DIAGNOSIS — G20.C PARKINSONISM, UNSPECIFIED PARKINSONISM TYPE: ICD-10-CM

## 2018-03-08 DIAGNOSIS — G24.01 LEVODOPA-INDUCED DYSKINESIA: ICD-10-CM

## 2018-03-08 DIAGNOSIS — R63.4 WEIGHT LOSS, ABNORMAL: ICD-10-CM

## 2018-03-08 DIAGNOSIS — R47.89: ICD-10-CM

## 2018-03-08 PROCEDURE — 99213 OFFICE O/P EST LOW 20 MIN: CPT | Mod: S$GLB,,, | Performed by: PSYCHIATRY & NEUROLOGY

## 2018-03-08 PROCEDURE — 99999 PR PBB SHADOW E&M-EST. PATIENT-LVL III: CPT | Mod: PBBFAC,,, | Performed by: PSYCHIATRY & NEUROLOGY

## 2018-03-08 NOTE — PATIENT INSTRUCTIONS
Please confirm that you are taking (or NOT taking) the yellow carbidopa-levodopa  pill.      03/08/2018    Dear Awilda Segovia,    Due to overwhelming demand, my [place] clinic location books to capacity very quickly every month.  I apologize for any inconveniences or delays in care.    In order to be sure your Neurologic needs met, we have two Nurse Practitioners, Karina Webb and Taylor Alcala, who also see patients with Memory and Movement Disorders in [place].  And, of course, when possible, please consider booking your appointment in [place], where our specialists, including me, have more appointments available.       I would like to see you Follow-up in about 4 months (around 7/8/2018)..  You will be contacted by my assistant, Maria Esther, once we have a schedule.      Please CALL my office (number below) if you have NOT heard from us before June 1, 2018.      Sincerely,       Pamela Garcia MD  Director, Movement Disorders and DBS Program  Department of Neurology  [phone]

## 2018-03-08 NOTE — ASSESSMENT & PLAN NOTE
She is not sure if still taking sinemet.  If she is, this may need to be reduces as she is having new dyskinesias.   -> patient to call or message me when she gets home with updated med list.

## 2018-03-09 ENCOUNTER — TELEPHONE (OUTPATIENT)
Dept: NEUROLOGY | Facility: CLINIC | Age: 63
End: 2018-03-09

## 2018-03-09 DIAGNOSIS — G20.C PARKINSONISM, UNSPECIFIED PARKINSONISM TYPE: ICD-10-CM

## 2018-03-09 DIAGNOSIS — G20.C PRIMARY PARKINSONISM: Primary | ICD-10-CM

## 2018-03-09 RX ORDER — CARBIDOPA AND LEVODOPA 25; 100 MG/1; MG/1
1 TABLET, EXTENDED RELEASE ORAL 2 TIMES DAILY
Qty: 180 TABLET | Refills: 3 | Status: SHIPPED | OUTPATIENT
Start: 2018-03-09

## 2018-03-09 RX ORDER — CARBIDOPA 25 MG/1
25 TABLET ORAL 4 TIMES DAILY
Qty: 360 TABLET | Refills: 3 | Status: SHIPPED | OUTPATIENT
Start: 2018-03-09 | End: 2018-03-26 | Stop reason: SDUPTHER

## 2018-03-09 NOTE — TELEPHONE ENCOUNTER
----- Message from Tico Segovia MD sent at 3/9/2018  6:49 AM CST -----  Awilda's med list is almost correct. The sinemet cr 25/100 she has been taking 1 qid  She also take carbidopa 25 qid   Otherwise the list looks correct. I will speak with her PCP today and get the list cleaned up-but do you want us to reduce the sinemet cr or rytary? FWIW- I don't think her dyskinesia is that bothersome to her but I also don't think the current dose is making that much difference.   Thanks for your help. Juanjo

## 2018-03-13 ENCOUNTER — OFFICE VISIT (OUTPATIENT)
Dept: PAIN MEDICINE | Facility: CLINIC | Age: 63
End: 2018-03-13
Payer: COMMERCIAL

## 2018-03-13 VITALS
WEIGHT: 110.81 LBS | RESPIRATION RATE: 20 BRPM | BODY MASS INDEX: 20.93 KG/M2 | TEMPERATURE: 98 F | SYSTOLIC BLOOD PRESSURE: 140 MMHG | OXYGEN SATURATION: 100 % | DIASTOLIC BLOOD PRESSURE: 69 MMHG | HEART RATE: 126 BPM

## 2018-03-13 DIAGNOSIS — M79.601 RIGHT ARM PAIN: Primary | ICD-10-CM

## 2018-03-13 DIAGNOSIS — M50.30 DDD (DEGENERATIVE DISC DISEASE), CERVICAL: ICD-10-CM

## 2018-03-13 DIAGNOSIS — G20.A1 PARKINSON'S DISEASE: ICD-10-CM

## 2018-03-13 PROCEDURE — 99214 OFFICE O/P EST MOD 30 MIN: CPT | Mod: S$GLB,,, | Performed by: ANESTHESIOLOGY

## 2018-03-13 PROCEDURE — 99999 PR PBB SHADOW E&M-EST. PATIENT-LVL IV: CPT | Mod: PBBFAC,,, | Performed by: ANESTHESIOLOGY

## 2018-03-13 NOTE — PROGRESS NOTES
This note was completed with dictation software and grammatical errors may exist.    CC: Right arm pain, right leg pain    HPI: The patient is a 62-year-old woman with a history of Parkinson's beginning over the last 6 years and presents in referral from Dr. Ivan for worsening right shoulder and arm pain.  She has been having rigidity and spasms in her right shoulder, forearm with numbness and tingling in her right hand.  She was seen by orthopedics who felt that she may have some adhesive capsulitis and had undergone several subacromial injections without benefit and she was referred for a right shoulder injection intra-articular under fluoroscopy.  This was done on 2/27/18 with no major relief.  She complains that the pain is now involving the right trapezius, right neck, right shoulder, numbness and tingling in the forearm and fingers.  She also complains of right leg pain, the leg moves on its own, has spasms in the leg.  She currently sees neurology, Dr. Garcia felt that she may have a variant of Parkinson's such as Corticobasal Degeneration which can cause rigidity asymmetrically.  She denies any exacerbation of the right arm pain with rotation of her neck, denies any marylou weakness.  Denies any major back pain.    Pain intervention history: She is status post right shoulder intra-articular steroid injection on 2/27/18 with no major relief. She has been taking Cymbalta 90 mg.  She has taken Flexeril 5 mg for muscle spasms with unclear relief.  She uses Voltaren gel.  She has been taking tramadol up to 4 times a day with some relief, she has taken it 2 tablets at a time in the past with severe pain and it did help and helped her sleep as well.      ROS: She reports difficulty sleeping, depression, anxiety, joint pain, back pain.  Balance of review of systems is negative.    Past Medical History:   Diagnosis Date    H/O hysterectomy for benign disease     Menopause present     Osteopenia     Parkinson  disease 12/2015    Sprain of left foot     2008       Past Surgical History:   Procedure Laterality Date    APPENDECTOMY      CHOLECYSTECTOMY      HYSTERECTOMY      TONSILLECTOMY, ADENOIDECTOMY         Social History     Social History    Marital status:      Spouse name: N/A    Number of children: N/A    Years of education: N/A     Social History Main Topics    Smoking status: Never Smoker    Smokeless tobacco: Never Used    Alcohol use 0.6 oz/week     1 Glasses of wine per week      Comment: social    Drug use: No    Sexual activity: Yes     Partners: Male     Other Topics Concern    Not on file     Social History Narrative    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  CVS: Regular rate and rhythm, normal palpable pulses.  Resp: Clear to auscultation bilaterally, no wheezes or rales.  Abdomen: Soft, NT/ND.  Musculoskeletal:  Present in wheelchair, requires assistance to stand.     Neuro:  Upper extremities: 5/5 strength bilaterally   Reflexes: Brachioradialis 2+, Bicep 2+, Tricep 2+.   Sensory: Intact and symmetrical to light touch and pinprick in C2-T1 dermatomes bilaterally.    Cervical Spine:  Cervical spine: Range of motion is full with flexion with no increased pain, mildly decreased with extension with no increased pain, range of motion is moderately decreased with lateral rotation of the right and mildly decreased with lateral rotation to the left.  Spurling's maneuver causes no neck pain to either side.  Myofascial exam: There is more fullness noted in the right cervical paraspinous and right  trapezius musculature compared to the left side, tenderness to palpation in the right cervical paraspinous and pedis musculature.        Imagin18 MRI Right shoulder:  Technique: Multiplanar MR imaging of the right shoulder was performed utilizing coronal and sagittal T2 fat suppressed, axial PD fat suppressed, and coronal T1 weighted pulse sequences.  Image quality is severely degraded by patient motion rendering the examination  uninterpretable despite multiple attempts to repeat sequences.    Assessment:  The patient is a 62-year-old woman with a history of Parkinson's beginning over the last 6 years and presents in referral from Dr. Ivna for worsening right shoulder and arm pain.    1. Right arm pain  MRI Cervical Spine Without Contrast   2. DDD (degenerative disc disease), cervical  MRI Cervical Spine Without Contrast   3. Parkinson's disease       Plan:  1.  We discussed that her pain seems to be mostly muscular due to rigidity and there are not necessarily any specific treatments other than pain medication and perhaps muscle relaxants that may help this, other than treating the Parkinson's.  Just to make sure we were not missing anything else, I would like to get a cervical spine MRI to make sure she does not have any nerve root impingement which could cause similar symptoms.  This is probably unlikely but would be useful to rule out.  Not sure if she is going to be able to lay still during the MRI but we are going to order this and once I see I can call her with the results.  2.  She has been able to take tramadol up to 4 times a day and I think this is reasonable and told her that if she chooses to take 2 at a time more often I think that would be okay especially to help if she is having problems with sleep.  She is already on Cymbalta 90 mg and so we have to be careful for the amount of tramadol so that we don't cause serotonin syndrome but I think this would be highly unlikely, she took much  higher doses of tramadol.  However we discussed the symptoms of this.  I'm going to call her once I see the MRI results.

## 2018-03-26 ENCOUNTER — TELEPHONE (OUTPATIENT)
Dept: FAMILY MEDICINE | Facility: CLINIC | Age: 63
End: 2018-03-26

## 2018-03-26 DIAGNOSIS — G20.C PARKINSONISM, UNSPECIFIED PARKINSONISM TYPE: ICD-10-CM

## 2018-03-26 DIAGNOSIS — G89.29 CHRONIC RIGHT SHOULDER PAIN: ICD-10-CM

## 2018-03-26 DIAGNOSIS — G47.00 INSOMNIA, UNSPECIFIED TYPE: ICD-10-CM

## 2018-03-26 DIAGNOSIS — M25.511 CHRONIC RIGHT SHOULDER PAIN: ICD-10-CM

## 2018-03-26 DIAGNOSIS — F32.1 MODERATE SINGLE CURRENT EPISODE OF MAJOR DEPRESSIVE DISORDER: ICD-10-CM

## 2018-03-26 DIAGNOSIS — G20.C PRIMARY PARKINSONISM: ICD-10-CM

## 2018-03-26 RX ORDER — TEMAZEPAM 30 MG/1
30 CAPSULE ORAL NIGHTLY PRN
Qty: 90 CAPSULE | Refills: 1 | Status: SHIPPED | OUTPATIENT
Start: 2018-03-26 | End: 2018-09-18

## 2018-03-26 RX ORDER — BACLOFEN 10 MG/1
10 TABLET ORAL 3 TIMES DAILY
Qty: 270 TABLET | Refills: 3 | Status: SHIPPED | OUTPATIENT
Start: 2018-03-26 | End: 2019-03-26

## 2018-03-26 RX ORDER — DULOXETIN HYDROCHLORIDE 30 MG/1
CAPSULE, DELAYED RELEASE ORAL
Qty: 90 CAPSULE | Refills: 3 | Status: ON HOLD | OUTPATIENT
Start: 2018-03-26 | End: 2018-05-24 | Stop reason: HOSPADM

## 2018-03-26 RX ORDER — DULOXETIN HYDROCHLORIDE 60 MG/1
CAPSULE, DELAYED RELEASE ORAL
Qty: 90 CAPSULE | Refills: 3 | Status: ON HOLD | OUTPATIENT
Start: 2018-03-26 | End: 2018-05-24

## 2018-03-26 RX ORDER — CARBIDOPA 25 MG/1
25 TABLET ORAL 4 TIMES DAILY
Qty: 360 TABLET | Refills: 3 | Status: SHIPPED | OUTPATIENT
Start: 2018-03-26

## 2018-03-26 RX ORDER — GABAPENTIN 300 MG/1
600 CAPSULE ORAL NIGHTLY
Qty: 180 CAPSULE | Refills: 3 | Status: SHIPPED | OUTPATIENT
Start: 2018-03-26 | End: 2019-03-26

## 2018-03-26 NOTE — TELEPHONE ENCOUNTER
Juanjo stated that they wanted to try a different muscle relaxer so I changed the med to liorisal 10 mg po tid and I refilled other meds at the right pharmacy.       Medications Ordered This Encounter      baclofen (LIORESAL) 10 MG tablet          Sig: Take 1 tablet (10 mg total) by mouth 3 (three) times daily.          Dispense:  270 tablet          Refill:  3      carbidopa (LODOSYN) 25 mg tablet          Sig: Take 1 tablet (25 mg total) by mouth 4 (four) times daily.          Dispense:  360 tablet          Refill:  3      carbidopa-levodopa (RYTARY) 23.75-95 mg CpSR          Sig: Take 2 capsules by mouth 4 (four) times daily.          Dispense:  720 capsule          Refill:  3      DULoxetine (CYMBALTA) 30 MG capsule          Sig: TAKE ONE CAPSULE BY MOUTH EVERY DAY. TAKE W/60 MG CAPSULE          Dispense:  90 capsule          Refill:  3      DULoxetine (CYMBALTA) 60 MG capsule          Sig: TAKE ONE CAPSULE BY MOUTH EVERY DAY TAKE WITH 30/MG CAPSULE          Dispense:  90 capsule          Refill:  3      gabapentin (NEURONTIN) 300 MG capsule          Sig: Take 2 capsules (600 mg total) by mouth every evening.          Dispense:  180 capsule          Refill:  3      temazepam (RESTORIL) 30 mg capsule          Sig: Take 1 capsule (30 mg total) by mouth nightly as needed for Insomnia.          Dispense:  90 capsule          Refill:  1

## 2018-04-03 RX ORDER — CYCLOBENZAPRINE HCL 5 MG
TABLET ORAL
Qty: 90 TABLET | Refills: 5 | OUTPATIENT
Start: 2018-04-03

## 2018-04-05 ENCOUNTER — TELEPHONE (OUTPATIENT)
Dept: PAIN MEDICINE | Facility: CLINIC | Age: 63
End: 2018-04-05

## 2018-04-05 NOTE — TELEPHONE ENCOUNTER
----- Message from Cyndi Denney sent at 4/4/2018  3:44 PM CDT -----  Patient stated she was not able to complete MRI due to Parkinson's disease and pain in arm/please call back at [phone] to advise.

## 2018-04-06 ENCOUNTER — LAB VISIT (OUTPATIENT)
Dept: LAB | Facility: HOSPITAL | Age: 63
End: 2018-04-06
Attending: FAMILY MEDICINE
Payer: COMMERCIAL

## 2018-04-06 DIAGNOSIS — R63.4 WEIGHT LOSS, ABNORMAL: ICD-10-CM

## 2018-04-06 LAB — HEMOCCULT STL QL IA: POSITIVE

## 2018-04-06 PROCEDURE — 82274 ASSAY TEST FOR BLOOD FECAL: CPT

## 2018-04-12 RX ORDER — CYCLOBENZAPRINE HCL 5 MG
5 TABLET ORAL 3 TIMES DAILY PRN
COMMUNITY
End: 2018-04-12 | Stop reason: SDUPTHER

## 2018-04-13 RX ORDER — CYCLOBENZAPRINE HCL 5 MG
5 TABLET ORAL 3 TIMES DAILY PRN
Qty: 270 TABLET | Refills: 3 | Status: SHIPPED | OUTPATIENT
Start: 2018-04-13 | End: 2018-07-19 | Stop reason: ALTCHOICE

## 2018-04-17 RX ORDER — GABAPENTIN 100 MG/1
CAPSULE ORAL
Qty: 60 CAPSULE | Refills: 5 | OUTPATIENT
Start: 2018-04-17

## 2018-04-19 RX ORDER — PANTOPRAZOLE SODIUM 40 MG/1
40 TABLET, DELAYED RELEASE ORAL DAILY
Qty: 90 TABLET | Refills: 2 | Status: ON HOLD | OUTPATIENT
Start: 2018-04-19 | End: 2018-05-24

## 2018-04-20 NOTE — PROGRESS NOTES
I have discussed the finding with her  about doing an EGD and colonoscopy and he will discuss with her and make a decision about the approach to workup.

## 2018-04-23 ENCOUNTER — TELEPHONE (OUTPATIENT)
Dept: FAMILY MEDICINE | Facility: CLINIC | Age: 63
End: 2018-04-23

## 2018-04-23 DIAGNOSIS — R19.5 POSITIVE FIT (FECAL IMMUNOCHEMICAL TEST): Primary | ICD-10-CM

## 2018-04-23 RX ORDER — PROMETHAZINE HYDROCHLORIDE 25 MG/1
25 TABLET ORAL EVERY 6 HOURS PRN
Qty: 6 TABLET | Refills: 0 | Status: SHIPPED | OUTPATIENT
Start: 2018-04-23 | End: 2018-11-01

## 2018-04-23 RX ORDER — SODIUM, POTASSIUM,MAG SULFATES 17.5-3.13G
SOLUTION, RECONSTITUTED, ORAL ORAL
Qty: 354 ML | Refills: 0 | Status: ON HOLD | OUTPATIENT
Start: 2018-04-23 | End: 2018-05-24 | Stop reason: HOSPADM

## 2018-04-23 NOTE — TELEPHONE ENCOUNTER
[phone] is her 's telephone #. Call him to book the EGD and colonoscopy for a positive FIT test.    Orders Placed This Encounter   Procedures    Case request GI: ESOPHAGOGASTRODUODENOSCOPY (EGD), COLONOSCOPY     Order Specific Question:   CPT Code:     Answer:   MA UPPER GI ENDOSCOPY,DIAGNOSIS [[id number]]     Order Specific Question:   CPT Code:     Answer:   MA COLONOSCOPY,DIAGNOSTIC [[id number]]     Order Specific Question:   Case Referring Provider     Answer:   GERMANIA GOVEA [[id number]]     Order Specific Question:   Medical Necessity:     Answer:   Medically Urgent [101]     Order Specific Question:   CPT Code:     Answer:   MA UPPER GI ENDOSCOPY,DIAGNOSIS [[id number]]     Order Specific Question:   CPT Code:     Answer:   MA COLONOSCOPY,DIAGNOSTIC [[id number]]       Medications Ordered This Encounter      promethazine (PHENERGAN) 25 MG tablet          Sig: Take 1 tablet (25 mg total) by mouth every 6 (six) hours as needed for Nausea.          Dispense:  6 tablet          Refill:  0      sodium,potassium,mag sulfates (SUPREP BOWEL PREP KIT) 17.5-3.13-1.6 gram SolR          Sig: Take as instructed on prep sheet          Dispense:  354 mL          Refill:  0

## 2018-04-25 NOTE — TELEPHONE ENCOUNTER
Pt procedure has been scheduled for 5/24/18. Instructions sent via [facility] [employer] to spouse ( Tico Luca) acct per his request.

## 2018-05-02 DIAGNOSIS — R19.5 POSITIVE FIT (FECAL IMMUNOCHEMICAL TEST): Primary | ICD-10-CM

## 2018-05-02 DIAGNOSIS — R63.0 DECREASED APPETITE: ICD-10-CM

## 2018-05-03 ENCOUNTER — OFFICE VISIT (OUTPATIENT)
Dept: FAMILY MEDICINE | Facility: CLINIC | Age: 63
End: 2018-05-03
Payer: COMMERCIAL

## 2018-05-03 VITALS — SYSTOLIC BLOOD PRESSURE: 137 MMHG | DIASTOLIC BLOOD PRESSURE: 78 MMHG | HEART RATE: 135 BPM | TEMPERATURE: 99 F

## 2018-05-03 DIAGNOSIS — R30.0 DYSURIA: ICD-10-CM

## 2018-05-03 DIAGNOSIS — K64.8 INTERNAL HEMORRHOIDS: ICD-10-CM

## 2018-05-03 PROCEDURE — 99214 OFFICE O/P EST MOD 30 MIN: CPT | Mod: S$GLB,,, | Performed by: NURSE PRACTITIONER

## 2018-05-03 PROCEDURE — 99999 PR PBB SHADOW E&M-EST. PATIENT-LVL III: CPT | Mod: PBBFAC,,, | Performed by: NURSE PRACTITIONER

## 2018-05-03 RX ORDER — HYDROCORTISONE 25 MG/G
CREAM TOPICAL 2 TIMES DAILY
Qty: 28 G | Refills: 2 | Status: SHIPPED | OUTPATIENT
Start: 2018-05-03 | End: 2018-11-01

## 2018-05-03 RX ORDER — PHENAZOPYRIDINE HYDROCHLORIDE 200 MG/1
200 TABLET, FILM COATED ORAL 3 TIMES DAILY PRN
Qty: 12 TABLET | Refills: 0 | Status: SHIPPED | OUTPATIENT
Start: 2018-05-03 | End: 2018-05-13

## 2018-05-03 NOTE — PROGRESS NOTES
Subjective:       Patient ID: Awilda Segovia is a 63 y.o. female.    Chief Complaint: Urinary Tract Infection    Dysuria    This is a recurrent problem. The current episode started yesterday. The problem has been waxing and waning. The quality of the pain is described as burning. The pain is mild. There has been no fever. Pertinent negatives include no vomiting or rash.     She complains of some rectal pain over the last week. Some difficulty with bowel movement. No bleeding.     She also has a vaginal estrogen ring in place that needs to be removed.     Review of Systems   Constitutional: Negative for fatigue, fever and unexpected weight change.   HENT: Negative for ear pain and sore throat.    Eyes: Negative for pain and visual disturbance.   Respiratory: Negative for cough and shortness of breath.    Cardiovascular: Negative for chest pain and palpitations.   Gastrointestinal: Positive for rectal pain. Negative for abdominal pain, diarrhea and vomiting.   Genitourinary: Positive for dysuria.   Musculoskeletal: Negative for arthralgias and myalgias.   Skin: Negative for color change and rash.   Neurological: Negative for dizziness and headaches.   Psychiatric/Behavioral: Negative for dysphoric mood and sleep disturbance. The patient is not nervous/anxious.        Vitals:    05/03/18 1456   BP: 137/78   Pulse: (!) 135   Temp: 98.6 °F (37 °C)       Objective:     Current Outpatient Prescriptions   Medication Sig Dispense Refill    baclofen (LIORESAL) 10 MG tablet Take 1 tablet (10 mg total) by mouth 3 (three) times daily. 270 tablet 3    carbidopa (LODOSYN) 25 mg tablet Take 1 tablet (25 mg total) by mouth 4 (four) times daily. 360 tablet 3    carbidopa-levodopa (RYTARY) 23.75-95 mg CpSR Take 2 capsules by mouth 4 (four) times daily. 720 capsule 3    carbidopa-levodopa  mg (SINEMET CR)  mg TbSR Take 1 tablet by mouth 2 (two) times daily. 1 tablet twice daily 180 tablet 3    cyclobenzaprine (FLEXERIL) 5  MG tablet Take 1 tablet (5 mg total) by mouth 3 (three) times daily as needed for Muscle spasms. 270 tablet 3    diclofenac sodium (VOLTAREN) 1 % Gel Apply 2 g topically 4 (four) times daily as needed. 300 g 3    DULoxetine (CYMBALTA) 30 MG capsule TAKE ONE CAPSULE BY MOUTH EVERY DAY. TAKE W/60 MG CAPSULE 90 capsule 3    DULoxetine (CYMBALTA) 60 MG capsule TAKE ONE CAPSULE BY MOUTH EVERY DAY TAKE WITH 30/MG CAPSULE 90 capsule 3    ESTRING 2 mg (7.5 mcg /24 hour) vaginal ring INSERT 1 VAGINAL RING(S) EVERY 3 MONTHS 1 each 3    gabapentin (NEURONTIN) 300 MG capsule Take 2 capsules (600 mg total) by mouth every evening. 180 capsule 3    pantoprazole (PROTONIX) 40 MG tablet Take 1 tablet (40 mg total) by mouth once daily. 90 tablet 2    promethazine (PHENERGAN) 25 MG tablet Take 1 tablet (25 mg total) by mouth every 6 (six) hours as needed for Nausea. 6 tablet 0    temazepam (RESTORIL) 30 mg capsule Take 1 capsule (30 mg total) by mouth nightly as needed for Insomnia. 90 capsule 1    traMADol (ULTRAM) 50 mg tablet Take 1 tablet (50 mg total) by mouth 4 (four) times daily. 360 tablet 1    cephALEXin (KEFLEX) 500 MG capsule Take 1 capsule (500 mg total) by mouth every 12 (twelve) hours. 14 capsule 0    hydrocortisone 2.5 % cream Apply topically 2 (two) times daily. 28 g 2    sodium,potassium,mag sulfates (SUPREP BOWEL PREP KIT) 17.5-3.13-1.6 gram SolR Take as instructed on prep sheet 354 mL 0     No current facility-administered medications for this visit.        Physical Exam   Constitutional: She is oriented to person, place, and time. She appears well-developed and well-nourished. No distress.   HENT:   Head: Normocephalic and atraumatic.   Eyes: EOM are normal. Pupils are equal, round, and reactive to light.   Neck: Normal range of motion. Neck supple.   Cardiovascular: Normal rate and regular rhythm.    Pulmonary/Chest: Effort normal and breath sounds normal.   Abdominal:   + hemorrhoids   Genitourinary:    Genitourinary Comments: Removed estrogen ring, tolerated well   Musculoskeletal: Normal range of motion.   Neurological: She is alert and oriented to person, place, and time.   Skin: Skin is warm and dry. No rash noted.   Psychiatric: She has a normal mood and affect. Judgment normal.   Nursing note and vitals reviewed.      Assessment:       1. Dysuria    2. Internal hemorrhoids        Plan:   Dysuria  -     Cancel: Urinalysis    Internal hemorrhoids    Other orders  -     phenazopyridine (PYRIDIUM) 200 MG tablet; Take 1 tablet (200 mg total) by mouth 3 (three) times daily as needed for Pain.  Dispense: 12 tablet; Refill: 0  -     hydrocortisone 2.5 % cream; Apply topically 2 (two) times daily.  Dispense: 28 g; Refill: 2    She was unable to collect a urine, she was given a cup to bring home and can return a specimen to the office    No Follow-up on file.

## 2018-05-05 RX ORDER — CEPHALEXIN 500 MG/1
500 CAPSULE ORAL EVERY 12 HOURS
Qty: 14 CAPSULE | Refills: 0 | Status: ON HOLD | OUTPATIENT
Start: 2018-05-05 | End: 2018-05-24 | Stop reason: HOSPADM

## 2018-05-24 ENCOUNTER — ANESTHESIA (OUTPATIENT)
Dept: ENDOSCOPY | Facility: HOSPITAL | Age: 63
End: 2018-05-24
Payer: COMMERCIAL

## 2018-05-24 ENCOUNTER — ANESTHESIA EVENT (OUTPATIENT)
Dept: ENDOSCOPY | Facility: HOSPITAL | Age: 63
End: 2018-05-24
Payer: COMMERCIAL

## 2018-05-24 ENCOUNTER — HOSPITAL ENCOUNTER (OUTPATIENT)
Facility: HOSPITAL | Age: 63
Discharge: STILL ADMITTED OR ADMINISTRATIVE | End: 2018-05-24
Attending: FAMILY MEDICINE | Admitting: FAMILY MEDICINE
Payer: COMMERCIAL

## 2018-05-24 ENCOUNTER — SURGERY (OUTPATIENT)
Age: 63
End: 2018-05-24

## 2018-05-24 VITALS
RESPIRATION RATE: 20 BRPM | SYSTOLIC BLOOD PRESSURE: 171 MMHG | BODY MASS INDEX: 20.98 KG/M2 | OXYGEN SATURATION: 98 % | DIASTOLIC BLOOD PRESSURE: 89 MMHG | HEART RATE: 124 BPM | WEIGHT: 114 LBS | HEIGHT: 62 IN | TEMPERATURE: 98 F

## 2018-05-24 DIAGNOSIS — K44.9 HIATAL HERNIA: ICD-10-CM

## 2018-05-24 DIAGNOSIS — R19.5 POSITIVE FIT (FECAL IMMUNOCHEMICAL TEST): Primary | ICD-10-CM

## 2018-05-24 DIAGNOSIS — R63.4 WEIGHT LOSS, ABNORMAL: ICD-10-CM

## 2018-05-24 DIAGNOSIS — K29.60 EROSIVE GASTRITIS: ICD-10-CM

## 2018-05-24 PROCEDURE — 88305 TISSUE EXAM BY PATHOLOGIST: CPT | Mod: 26,,, | Performed by: PATHOLOGY

## 2018-05-24 PROCEDURE — 25000003 PHARM REV CODE 250: Performed by: FAMILY MEDICINE

## 2018-05-24 PROCEDURE — 45378 DIAGNOSTIC COLONOSCOPY: CPT | Mod: ,,, | Performed by: FAMILY MEDICINE

## 2018-05-24 PROCEDURE — 37000009 HC ANESTHESIA EA ADD 15 MINS: Performed by: FAMILY MEDICINE

## 2018-05-24 PROCEDURE — 27201012 HC FORCEPS, HOT/COLD, DISP: Performed by: FAMILY MEDICINE

## 2018-05-24 PROCEDURE — 88305 TISSUE EXAM BY PATHOLOGIST: CPT | Performed by: PATHOLOGY

## 2018-05-24 PROCEDURE — 37000008 HC ANESTHESIA 1ST 15 MINUTES: Performed by: FAMILY MEDICINE

## 2018-05-24 PROCEDURE — 63600175 PHARM REV CODE 636 W HCPCS: Performed by: NURSE ANESTHETIST, CERTIFIED REGISTERED

## 2018-05-24 PROCEDURE — 43239 EGD BIOPSY SINGLE/MULTIPLE: CPT | Mod: 51,,, | Performed by: FAMILY MEDICINE

## 2018-05-24 PROCEDURE — 43239 EGD BIOPSY SINGLE/MULTIPLE: CPT | Performed by: FAMILY MEDICINE

## 2018-05-24 PROCEDURE — 45378 DIAGNOSTIC COLONOSCOPY: CPT | Performed by: FAMILY MEDICINE

## 2018-05-24 RX ORDER — LIDOCAINE HCL/PF 100 MG/5ML
SYRINGE (ML) INTRAVENOUS
Status: DISCONTINUED | OUTPATIENT
Start: 2018-05-24 | End: 2018-05-24

## 2018-05-24 RX ORDER — SODIUM CHLORIDE, SODIUM LACTATE, POTASSIUM CHLORIDE, CALCIUM CHLORIDE 600; 310; 30; 20 MG/100ML; MG/100ML; MG/100ML; MG/100ML
INJECTION, SOLUTION INTRAVENOUS CONTINUOUS
Status: DISCONTINUED | OUTPATIENT
Start: 2018-05-24 | End: 2018-05-24 | Stop reason: HOSPADM

## 2018-05-24 RX ORDER — DULOXETIN HYDROCHLORIDE 60 MG/1
60 CAPSULE, DELAYED RELEASE ORAL DAILY
Qty: 90 CAPSULE | Refills: 3 | Status: SHIPPED | OUTPATIENT
Start: 2018-01-22

## 2018-05-24 RX ORDER — PROPOFOL 10 MG/ML
VIAL (ML) INTRAVENOUS
Status: DISCONTINUED | OUTPATIENT
Start: 2018-05-24 | End: 2018-05-24

## 2018-05-24 RX ORDER — PANTOPRAZOLE SODIUM 40 MG/1
TABLET, DELAYED RELEASE ORAL
Qty: 120 TABLET | Refills: 2 | Status: SHIPPED | OUTPATIENT
Start: 2018-05-24 | End: 2018-11-01

## 2018-05-24 RX ADMIN — PROPOFOL 40 MG: 10 INJECTION, EMULSION INTRAVENOUS at 09:05

## 2018-05-24 RX ADMIN — LIDOCAINE HYDROCHLORIDE 80 ML: 20 INJECTION, SOLUTION INTRAVENOUS at 09:05

## 2018-05-24 RX ADMIN — PROPOFOL 80 MG: 10 INJECTION, EMULSION INTRAVENOUS at 09:05

## 2018-05-24 RX ADMIN — SODIUM CHLORIDE, SODIUM LACTATE, POTASSIUM CHLORIDE, AND CALCIUM CHLORIDE: 600; 310; 30; 20 INJECTION, SOLUTION INTRAVENOUS at 09:05

## 2018-05-24 RX ADMIN — PROPOFOL 40 MG: 10 INJECTION, EMULSION INTRAVENOUS at 10:05

## 2018-05-24 RX ADMIN — PROPOFOL 30 MG: 10 INJECTION, EMULSION INTRAVENOUS at 09:05

## 2018-05-24 NOTE — DISCHARGE INSTRUCTIONS
What Is a Hiatal Hernia?    Hiatal hernia is when the area where the stomach and esophagus meet bulges up through the diaphragm into the chest cavity. In some cases, part of the stomach may bulge above the diaphragm. Stomach acid may move up into the esophagus and cause symptoms. The symptoms are often blamed on gastroesophageal reflux disease (GERD). You may only know about the hernia when it shows up on an X-ray taken for other reasons.   What you may feel  The hiatus is a normal hole in the diaphragm. The esophagus passes through this hole and leads to the stomach. In some cases, part of the stomach may bulge above the diaphragm. This bulge is called a hernia. Stomach acid may move up into the esophagus and cause symptoms.  When you eat, the muscle at the hiatus relaxes to allow food to pass into the stomach. It tightens again to keep food and digestive acids in the stomach.  Many people with hiatal hernias have mild symptoms. You may notice the following GERD symptoms:  · Heartburn or other chest discomfort  · A feeling of chest fullness after a meal  · Frequent burping  · Acid taste in the mouth  · Trouble swallowing  Treating symptoms  If you have been diagnosed with hiatal hernia, these suggestions may help improve symptoms:  · Lose excess weight. Extra weight puts pressure on the stomach and esophagus.  · Dont lie down after eating. Sit up for at least an hour after eating. Lying down after eating can increase symptoms.  · Avoid certain foods and drinks. These include fatty foods, chocolate, coffee, mint, and other foods that cause symptoms for you.  · Dont smoke or drink alcohol. These can worsen symptoms.  · Look at your medicines. Discuss your medicines with your healthcare provider. Many medicines can cause symptoms.  · Consider an antacid medicine. Ask your healthcare provider about over-the-counter and prescription medicines that may help.  · Ask about surgery, if needed. Surgery is a treatment  choice for some people. Your healthcare provider can determine if surgery is an option for you.    Date Last Reviewed: 10/1/2016  © 2064-6737 [employer]. [address] [place] [address], [place] [address]. All rights reserved. This information is not intended as a substitute for professional medical care. Always follow your healthcare professional's instructions.        Gastritis (Adult)    Gastritis is inflammation and irritation of the stomach lining. It can be present for a short time (acute) or be long lasting (chronic). Gastritis is often caused by infection with bacteria called H pylori. More than a third of people in the [place] have this bacteria in their bodies. In many cases, H pylori causes no problems or symptoms. In some people, though, the infection irritates the stomach lining and causes gastritis. Other causes of stomach irritation include drinking alcohol or taking pain-relieving medicines called NSAIDs (such as aspirin or ibuprofen).   Symptoms of gastritis can include:  · Abdominal pain or bloating  · Loss of appetite  · Nausea or vomiting  · Vomiting blood or having black stools  · Feeling more tired than usual  An inflamed and irritated stomach lining is more likely to develop a sore called an ulcer. To help prevent this, gastritis should be treated.  Home care  If needed, medicines may be prescribed. If you have H pylori infection, treating it will likely relieve your symptoms. Other changes can help reduce stomach irritation and help it heal.  · If you have been prescribed medicines for H pylori infection, take them as directed. Take all of the medicine until it is finished or your healthcare provider tells you to stop, even if you feel better.  · Your healthcare provider may recommend avoiding NSAIDs. If you take daily aspirin for your heart or other medical reasons, do not stop without talking to your healthcare provider first.  · Avoid drinking alcohol.  · Stop smoking. Smoking can  irritate the stomach and delay healing. As much as possible, stay away from second hand smoke.  Follow-up care  Follow up with your healthcare provider, or as advised by our staff. Testing may be needed to check for inflammation or an ulcer.  When to seek medical advice  Call your healthcare provider for any of the following:  · Stomach pain that gets worse or moves to the lower right abdomen (appendix area)  · Chest pain that appears or gets worse, or spreads to the back, neck, shoulder, or arm  · Frequent vomiting (cant keep down liquids)  · Blood in the stool or vomit (red or black in color)  · Feeling weak or dizzy  · Fever of 100.4ºF (38ºC) or higher, or as directed by your healthcare provider  Date Last Reviewed: 6/22/2015  © 9592-7299 [employer]. [address] [place] [address], [place] [address]. All rights reserved. This information is not intended as a substitute for professional medical care. Always follow your healthcare professional's instructions.

## 2018-05-24 NOTE — ANESTHESIA RELEASE NOTE
"Anesthesia Release from PACU Note    Patient: Awilda Segovia    Procedure(s) Performed: Procedure(s) (LRB):  ESOPHAGOGASTRODUODENOSCOPY (EGD) (N/A)  COLONOSCOPY (N/A)    Anesthesia type: MAC    Post pain: Adequate analgesia    Post assessment: no apparent anesthetic complications and tolerated procedure well    Last Vitals:   Visit Vitals  BP (!) 184/111 (Patient Position: Lying)   Pulse (!) 124   Temp 36.9 °C (98.4 °F) (Oral)   Resp 20   Ht 5' 1.5" (1.562 m)   Wt 51.7 kg (114 lb)   SpO2 100%   Breastfeeding? No   BMI 21.19 kg/m²       Post vital signs: stable    Level of consciousness: awake, alert  and oriented    Nausea/Vomiting: no nausea/no vomiting    Complications: none    Airway Patency: patent    Respiratory: unassisted, spontaneous ventilation, room air    Cardiovascular: stable and blood pressure at baseline    Hydration: euvolemic  "

## 2018-05-24 NOTE — DISCHARGE SUMMARY
Endoscopy Discharge Summary      Admit Date: 5/24/2018    Discharge Date and Time:  5/24/2018 10:23 AM    Attending Physician: Gato Ivan MD     Discharge Physician: Gato Ivan MD     Principal Admitting Diagnoses: Positive FIT (fecal immunochemical test)         Discharge Diagnosis: The primary encounter diagnosis was Positive FIT (fecal immunochemical test). Diagnoses of Erosive gastritis, Hiatal hernia, and Weight loss, abnormal were also pertinent to this visit.     Discharged Condition: Good    Indication for Admission: Positive FIT (fecal immunochemical test)     Hospital Course: Patient was admitted for an inpatient procedure and tolerated the procedure well with no complications.    Significant Diagnostic Studies: EGD with cold biopsy and Colonoscopy    Pathology (if any):  Specimen (12h ago through future)    Start     Ordered    05/24/18 0946  Specimen to Pathology - Surgery  Once     Comments:  1. Antrum biopsies- erosive gastritis, r/o H pylori      05/24/18 1012          Estimated Blood Loss: 1 ml.    Discussed with: patient and family.    Disposition: Home.    Follow Up/Patient Instructions:   Current Discharge Medication List      CONTINUE these medications which have CHANGED    Details   DULoxetine (CYMBALTA) 60 MG capsule Take 1 capsule (60 mg total) by mouth once daily.  Qty: 90 capsule, Refills: 3      pantoprazole (PROTONIX) 40 MG tablet Take 1 po bid x 1 month then 1 po q day  Qty: 120 tablet, Refills: 2         CONTINUE these medications which have NOT CHANGED    Details   carbidopa (LODOSYN) 25 mg tablet Take 1 tablet (25 mg total) by mouth 4 (four) times daily.  Qty: 360 tablet, Refills: 3    Associated Diagnoses: Primary Parkinsonism      carbidopa-levodopa (RYTARY) 23.75-95 mg CpSR Take 2 capsules by mouth 4 (four) times daily.  Qty: 720 capsule, Refills: 3    Associated Diagnoses: Parkinsonism, unspecified Parkinsonism type      carbidopa-levodopa  mg (SINEMET CR)   mg TbSR Take 1 tablet by mouth 2 (two) times daily. 1 tablet twice daily  Qty: 180 tablet, Refills: 3    Associated Diagnoses: Parkinsonism, unspecified Parkinsonism type      gabapentin (NEURONTIN) 300 MG capsule Take 2 capsules (600 mg total) by mouth every evening.  Qty: 180 capsule, Refills: 3      promethazine (PHENERGAN) 25 MG tablet Take 1 tablet (25 mg total) by mouth every 6 (six) hours as needed for Nausea.  Qty: 6 tablet, Refills: 0      temazepam (RESTORIL) 30 mg capsule Take 1 capsule (30 mg total) by mouth nightly as needed for Insomnia.  Qty: 90 capsule, Refills: 1    Associated Diagnoses: Insomnia, unspecified type      traMADol (ULTRAM) 50 mg tablet Take 1 tablet (50 mg total) by mouth 4 (four) times daily.  Qty: 360 tablet, Refills: 1      baclofen (LIORESAL) 10 MG tablet Take 1 tablet (10 mg total) by mouth 3 (three) times daily.  Qty: 270 tablet, Refills: 3      cyclobenzaprine (FLEXERIL) 5 MG tablet Take 1 tablet (5 mg total) by mouth 3 (three) times daily as needed for Muscle spasms.  Qty: 270 tablet, Refills: 3      diclofenac sodium (VOLTAREN) 1 % Gel Apply 2 g topically 4 (four) times daily as needed.  Qty: 300 g, Refills: 3    Associated Diagnoses: Right shoulder pain, unspecified chronicity      ESTRING 2 mg (7.5 mcg /24 hour) vaginal ring INSERT 1 VAGINAL RING(S) EVERY 3 MONTHS  Qty: 1 each, Refills: 3      hydrocortisone 2.5 % cream Apply topically 2 (two) times daily.  Qty: 28 g, Refills: 2         STOP taking these medications       sodium,potassium,mag sulfates (SUPREP BOWEL PREP KIT) 17.5-3.13-1.6 gram SolR Comments:   Reason for Stopping:         cephALEXin (KEFLEX) 500 MG capsule Comments:   Reason for Stopping:         venlafaxine (EFFEXOR-XR) 75 MG 24 hr capsule Comments:   Reason for Stopping:                 Discharge Procedure Orders  Diet general     Activity as tolerated     Call MD for:  temperature >100.4     Call MD for:  persistent nausea and vomiting     Call MD for:   severe uncontrolled pain     Call MD for:  difficulty breathing, headache or visual disturbances     Call MD for:  redness, tenderness, or signs of infection (pain, swelling, redness, odor or green/yellow discharge around incision site)     Call MD for:  hives     Call MD for:  persistent dizziness or light-headedness     No dressing needed         Follow-up Information     Gato Ivan MD. Call in 1 week.    Specialty:  Family Medicine  Why:  To receive pathology results.  Contact information:  [address] [facility] [address] [phone]                   [email or web address]

## 2018-05-24 NOTE — ANESTHESIA POSTPROCEDURE EVALUATION
"Anesthesia Post Evaluation    Patient: Awilda Segovia    Procedure(s) Performed: Procedure(s) (LRB):  ESOPHAGOGASTRODUODENOSCOPY (EGD) (N/A)  COLONOSCOPY (N/A)    Final Anesthesia Type: MAC  Patient location during evaluation: GI PACU  Patient participation: Yes- Able to Participate  Level of consciousness: awake and alert  Post-procedure vital signs: reviewed and stable  Pain management: adequate  Airway patency: patent  PONV status at discharge: No PONV  Anesthetic complications: no      Cardiovascular status: blood pressure returned to baseline  Respiratory status: unassisted, spontaneous ventilation and room air  Hydration status: euvolemic  Follow-up not needed.        Visit Vitals  BP (!) 184/111 (Patient Position: Lying)   Pulse (!) 124   Temp 36.9 °C (98.4 °F) (Oral)   Resp 20   Ht 5' 1.5" (1.562 m)   Wt 51.7 kg (114 lb)   SpO2 100%   Breastfeeding? No   BMI 21.19 kg/m²       Pain/Renetta Score: Pain Assessment Performed: Yes (5/24/2018  9:25 AM)  Presence of Pain: denies (5/24/2018  9:25 AM)      "

## 2018-05-24 NOTE — [employer] PATIENT INSTRUCTIONS
Discharge Summary/Instructions after an Endoscopic Procedure  Patient Name: Awilda Segovia  Patient MRN: [id number]  Patient YOB: 1955  Thursday, May 24, 2018 Gato Ivan MD  RESTRICTIONS:  During your procedure today, you received medications for sedation.  These   medications may affect your judgment, balance and coordination.  Therefore,   for 24 hours, you have the following restrictions:   - DO NOT drive a car, operate machinery, make legal/financial decisions,   sign important papers or drink alcohol.    ACTIVITY:  The following day: return to full activity including work, except no heavy   lifting, straining or running for 3 days if polyps were removed.  DIET:  Eat and drink normally unless instructed otherwise.     TREATMENT FOR COMMON SIDE EFFECTS:  - Mild abdominal pain, nausea, belching, bloating or excessive gas:  rest,   eat lightly and use a heating pad.  - Sore Throat: treat with throat lozenges and/or gargle with warm salt   water.  - Because air was used during the procedure, expelling large amounts of air   from your rectum or belching is normal.  - If a bowel prep was taken, you may not have a bowel movement for 1-3 days.    This is normal.  SYMPTOMS TO WATCH FOR AND REPORT TO YOUR PHYSICIAN:  1. Abdominal pain or bloating, other than gas cramps.  2. Chest pain.  3. Back pain.  4. Signs of infection such as: chills or fever occurring within 24 hours   after the procedure.  5. Rectal bleeding, which would show as bright red, maroon, or black stools.   (A tablespoon of blood from the rectum is not serious, especially if   hemorrhoids are present.)  6. Vomiting.  7. Weakness or dizziness.  GO DIRECTLY TO THE NEAREST EMERGENCY ROOM IF YOU HAVE ANY OF THE FOLLOWING:      Difficulty breathing              Chills and/or fever over 101 F   Persistent vomiting and/or vomiting blood   Severe abdominal pain   Severe chest pain   Black, tarry stools   Bleeding- more than one tablespoon   Any other  symptom or condition that you feel may need urgent attention  Your doctor recommends these additional instructions:  If any biopsies were taken, your doctors clinic will contact you in 1 to 2   weeks with any results.  - Patient has a contact number available for emergencies.  The signs and   symptoms of potential delayed complications were discussed with the   patient.  Return to normal activities tomorrow.  Written discharge   instructions were provided to the patient.   - Resume previous diet.   - Continue present medications.   - Repeat colonoscopy in 10 years for screening purposes.   - Discharge patient to home (via wheelchair).  For questions, problems or results please call your physician Gato Ivan MD at Work:  [phone]  If you have any questions about the above instructions, call the GI   department at [phone] or call the endoscopy unit at [phone]   from 7am until 3 pm.  [facility] [place], EMERGENCY ROOM PHONE NUMBER:   [phone]  IF A COMPLICATION OR EMERGENCY SITUATION ARISES AND YOU ARE UNABLE TO REACH   YOUR PHYSICIAN - GO DIRECTLY TO THE EMERGENCY ROOM.  I have read or have had read to me these discharge instructions for my   procedure and have received a written copy.  I understand these   instructions and will follow-up with my physician if I have any questions.     __________________________________       _____________________________________  Nurse Signature                                          Patient/Designated   Responsible Party Signature  Gato Ivan MD  5/24/2018 10:16:12 AM  This report has been verified and signed electronically.  [employer]

## 2018-05-24 NOTE — H&P
Short Stay Endoscopy History and Physical    PCP - Gato Ivan MD    Procedure - EGD and colonoscopy  ASA - 3  Mallampati - per anesthesia  History of Anesthesia problems - no  Family history Anesthesia problems -  no     HPI:  This is a 63 y.o. female here for evaluation of :   Active Hospital Problems    Diagnosis  POA    *Positive FIT (fecal immunochemical test) [R19.5]  Yes      Resolved Hospital Problems    Diagnosis Date Resolved POA   No resolved problems to display.         Health Maintenance       Date Due Completion Date    TETANUS VACCINE 04/07/1971 ---    Zoster Vaccine 04/07/2013 ---    Mammogram 05/18/2018 5/18/2017    Influenza Vaccine 08/01/2018 9/27/2017    Override on 9/26/2017: Done    Pneumococcal (65+) (1 of 2 - PCV13) 11/27/2018 11/27/2017    Fecal Occult Blood Test (FOBT)/FitKit 04/06/2019 4/6/2018    DEXA SCAN 10/13/2019 10/13/2016    Lipid Panel 01/06/2022 1/6/2017        She has been found to have a positive FIT test.  She was having problems in the past with her diet and apatite and this has been helped by [facility].  It was felt to have possibly been due to her severe parkinson's but due to the positive FIT testing, she is going to have an EGD and colonoscopy for evaluation.     ROS:  CONSTITUTIONAL: Denies weight change,  fatigue, fevers, chills, night sweats.  CARDIOVASCULAR: Denies chest pain, shortness of breath, orthopnea and edema.  RESPIRATORY: Denies cough, hemoptysis, dyspnea, and wheezing.  GI: See HPI.    Medical History:   Past Medical History:   Diagnosis Date    H/O hysterectomy for benign disease     Menopause present     Occult blood in stools     Osteopenia     Parkinson disease 12/2015    Sprain of left foot     2008       Surgical History:   Past Surgical History:   Procedure Laterality Date    APPENDECTOMY      CHOLECYSTECTOMY      HYSTERECTOMY      TONSILLECTOMY, ADENOIDECTOMY         Family History:   Family History   Problem Relation Age of Onset     Diabetes Mother     Cataracts Mother     Hypertension Mother     Cancer Maternal Aunt         breast    Cancer Maternal Uncle         brain , bone     Cancer Paternal Aunt         uterine    Diabetes Maternal Grandfather     Cataracts Father        Social History:   Social History   Substance Use Topics    Smoking status: Never Smoker    Smokeless tobacco: Never Used    Alcohol use 0.6 oz/week     1 Glasses of wine per week      Comment: social       Allergies:   Review of patient's allergies indicates:   Allergen Reactions    Silver nitrate Anaphylaxis     Put wholes skin     Demerol [meperidine]      Can worsen PD    Reglan [metoclopramide hcl]      Can worsen PD    Risperdal [risperidone]      Can worsen PD       Medications:   No current facility-administered medications on file prior to encounter.      Current Outpatient Prescriptions on File Prior to Encounter   Medication Sig Dispense Refill    baclofen (LIORESAL) 10 MG tablet Take 1 tablet (10 mg total) by mouth 3 (three) times daily. 270 tablet 3    carbidopa (LODOSYN) 25 mg tablet Take 1 tablet (25 mg total) by mouth 4 (four) times daily. 360 tablet 3    carbidopa-levodopa (RYTARY) 23.75-95 mg CpSR Take 2 capsules by mouth 4 (four) times daily. 720 capsule 3    carbidopa-levodopa  mg (SINEMET CR)  mg TbSR Take 1 tablet by mouth 2 (two) times daily. 1 tablet twice daily 180 tablet 3    cyclobenzaprine (FLEXERIL) 5 MG tablet Take 1 tablet (5 mg total) by mouth 3 (three) times daily as needed for Muscle spasms. 270 tablet 3    diclofenac sodium (VOLTAREN) 1 % Gel Apply 2 g topically 4 (four) times daily as needed. 300 g 3    DULoxetine (CYMBALTA) 30 MG capsule TAKE ONE CAPSULE BY MOUTH EVERY DAY. TAKE W/60 MG CAPSULE 90 capsule 3    DULoxetine (CYMBALTA) 60 MG capsule TAKE ONE CAPSULE BY MOUTH EVERY DAY TAKE WITH 30/MG CAPSULE 90 capsule 3    ESTRING 2 mg (7.5 mcg /24 hour) vaginal ring INSERT 1 VAGINAL RING(S) EVERY 3  MONTHS 1 each 3    gabapentin (NEURONTIN) 300 MG capsule Take 2 capsules (600 mg total) by mouth every evening. 180 capsule 3    pantoprazole (PROTONIX) 40 MG tablet Take 1 tablet (40 mg total) by mouth once daily. 90 tablet 2    promethazine (PHENERGAN) 25 MG tablet Take 1 tablet (25 mg total) by mouth every 6 (six) hours as needed for Nausea. 6 tablet 0    sodium,potassium,mag sulfates (SUPREP BOWEL PREP KIT) 17.5-3.13-1.6 gram SolR Take as instructed on prep sheet 354 mL 0    temazepam (RESTORIL) 30 mg capsule Take 1 capsule (30 mg total) by mouth nightly as needed for Insomnia. 90 capsule 1    traMADol (ULTRAM) 50 mg tablet Take 1 tablet (50 mg total) by mouth 4 (four) times daily. 360 tablet 1    [DISCONTINUED] venlafaxine (EFFEXOR-XR) 75 MG 24 hr capsule Take 1 capsule (75 mg total) by mouth once daily. 30 capsule 11       Physical Exam:  Vital Signs: see nurses notes.  General Appearance: Well appearing in no acute distress  ENT: OP clear  Chest: CTA B  CV: RRR, no m/r/g  Abd: s/nt/nd/nabs  Ext: no edema    Labs:Reviewed    IMP:  Active Hospital Problems    Diagnosis  POA    *Positive FIT (fecal immunochemical test) [R19.5]  Yes      Resolved Hospital Problems    Diagnosis Date Resolved POA   No resolved problems to display.         Plan:   I have explained the risks and benefits of upper endoscopy and colonoscopy to the patient including but not limited to bleeding, perforation, infection, and death. The patient wishes to proceed.

## 2018-05-24 NOTE — ANESTHESIA PREPROCEDURE EVALUATION
05/24/2018  Awilda Segovia is a 63 y.o., female.    Pre-op Assessment    I have reviewed the Patient Summary Reports.     I have reviewed the Nursing Notes.   I have reviewed the Medications.     Review of Systems  Anesthesia Hx:  No problems with previous Anesthesia  History of prior surgery of interest to airway management or planning: Denies Family Hx of Anesthesia complications.  Personal Hx of Anesthesia complications   Social:  Non-Smoker, No Alcohol Use    Cardiovascular:   Exercise tolerance: poor    Hepatic/GI:   Bowel Prep.    Neurological:   Neuromuscular Disease, Severe sudden onset Partkinson's disease - currently disabled and wheelchair/bedridden   Psych:   depression          Physical Exam  General:  Well nourished    Airway/Jaw/Neck:  Airway Findings: Mouth Opening: Normal Tongue: Normal  General Airway Assessment: Adult  Mallampati: II  Improves to II with phonation.  TM Distance: Normal, at least 6 cm       Chest/Lungs:  Chest/Lungs Findings: Normal Respiratory Rate     Heart/Vascular:  Heart Findings: Rate: Normal        Mental Status:  Mental Status Findings:  Cooperative, Alert and Oriented         Anesthesia Plan  Type of Anesthesia, risks & benefits discussed:  Anesthesia Type:  MAC  Patient's Preference:   Intra-op Monitoring Plan: standard ASA monitors  Intra-op Monitoring Plan Comments:   Post Op Pain Control Plan:   Post Op Pain Control Plan Comments:   Induction:   IV  Beta Blocker:  Patient is not currently on a Beta-Blocker (No further documentation required).       Informed Consent: Patient understands risks and agrees with Anesthesia plan.  Questions answered. Anesthesia consent signed with patient.  ASA Score: 3     Day of Surgery Review of History & Physical: I have interviewed and examined the patient. I have reviewed the patient's H&P dated:

## 2018-05-24 NOTE — [employer] PATIENT INSTRUCTIONS
Discharge Summary/Instructions after an Endoscopic Procedure  Patient Name: Awilda Segovia  Patient MRN: [id number]  Patient YOB: 1955  Thursday, May 24, 2018 Gato Ivan MD  RESTRICTIONS:  During your procedure today, you received medications for sedation.  These   medications may affect your judgment, balance and coordination.  Therefore,   for 24 hours, you have the following restrictions:   - DO NOT drive a car, operate machinery, make legal/financial decisions,   sign important papers or drink alcohol.    ACTIVITY:  The following day: return to full activity including work, except no heavy   lifting, straining or running for 3 days if polyps were removed.  DIET:  Eat and drink normally unless instructed otherwise.     TREATMENT FOR COMMON SIDE EFFECTS:  - Mild abdominal pain, nausea, belching, bloating or excessive gas:  rest,   eat lightly and use a heating pad.  - Sore Throat: treat with throat lozenges and/or gargle with warm salt   water.  - Because air was used during the procedure, expelling large amounts of air   from your rectum or belching is normal.  - If a bowel prep was taken, you may not have a bowel movement for 1-3 days.    This is normal.  SYMPTOMS TO WATCH FOR AND REPORT TO YOUR PHYSICIAN:  1. Abdominal pain or bloating, other than gas cramps.  2. Chest pain.  3. Back pain.  4. Signs of infection such as: chills or fever occurring within 24 hours   after the procedure.  5. Rectal bleeding, which would show as bright red, maroon, or black stools.   (A tablespoon of blood from the rectum is not serious, especially if   hemorrhoids are present.)  6. Vomiting.  7. Weakness or dizziness.  GO DIRECTLY TO THE NEAREST EMERGENCY ROOM IF YOU HAVE ANY OF THE FOLLOWING:      Difficulty breathing              Chills and/or fever over 101 F   Persistent vomiting and/or vomiting blood   Severe abdominal pain   Severe chest pain   Black, tarry stools   Bleeding- more than one tablespoon   Any other  symptom or condition that you feel may need urgent attention  Your doctor recommends these additional instructions:  If any biopsies were taken, your doctors clinic will contact you in 1 to 2   weeks with any results.  - Patient has a contact number available for emergencies.  The signs and   symptoms of potential delayed complications were discussed with the   patient.  Return to normal activities tomorrow.  Written discharge   instructions were provided to the patient.   - Resume previous diet.   - Continue present medications.   - Await pathology results.   - No repeat upper endoscopy.   - Telephone my office for pathology results in 1 week.   - Use Protonix (pantoprazole) 40 mg PO BID for 1 month.   - then we will change to 40 mg protonix daily after a month of bid use.  - Discharge patient to home (via wheelchair).  For questions, problems or results please call your physician Gato Ivan MD at Work:  [phone]  If you have any questions about the above instructions, call the GI   department at [phone] or call the endoscopy unit at [phone]   from 7am until 3 pm.  [facility] [place], EMERGENCY ROOM PHONE NUMBER:   [phone]  IF A COMPLICATION OR EMERGENCY SITUATION ARISES AND YOU ARE UNABLE TO REACH   YOUR PHYSICIAN - GO DIRECTLY TO THE EMERGENCY ROOM.  I have read or have had read to me these discharge instructions for my   procedure and have received a written copy.  I understand these   instructions and will follow-up with my physician if I have any questions.     __________________________________       _____________________________________  Nurse Signature                                          Patient/Designated   Responsible Party Signature  Gato Ivan MD  5/24/2018 10:19:37 AM  This report has been verified and signed electronically.  [employer]

## 2018-05-24 NOTE — TRANSFER OF CARE
"Anesthesia Transfer of Care Note    Patient: Awilda Segovia    Procedure(s) Performed: Procedure(s) (LRB):  ESOPHAGOGASTRODUODENOSCOPY (EGD) (N/A)  COLONOSCOPY (N/A)    Patient location: GI    Anesthesia Type: MAC    Transport from OR: Transported from OR on room air with adequate spontaneous ventilation    Post pain: adequate analgesia    Post assessment: no apparent anesthetic complications    Post vital signs: stable    Level of consciousness: awake, alert and oriented    Nausea/Vomiting: no nausea/vomiting    Complications: none    Transfer of care protocol was followed      Last vitals:   Visit Vitals  BP (!) 184/111 (Patient Position: Lying)   Pulse (!) 124   Temp 36.9 °C (98.4 °F) (Oral)   Resp 20   Ht 5' 1.5" (1.562 m)   Wt 51.7 kg (114 lb)   SpO2 100%   Breastfeeding? No   BMI 21.19 kg/m²     "

## 2018-05-28 RX ORDER — CEPHALEXIN 500 MG/1
500 CAPSULE ORAL EVERY 6 HOURS
Qty: 40 CAPSULE | Refills: 0 | Status: SHIPPED | OUTPATIENT
Start: 2018-05-28 | End: 2018-11-01

## 2018-06-02 NOTE — PROGRESS NOTES
The stomach biopsies were benign with no sign of precancerous changes and there was no sign of H. Pylori.  Based on this, please continue the current regimen that is being used.

## 2018-06-27 ENCOUNTER — TELEPHONE (OUTPATIENT)
Dept: NEUROLOGY | Facility: CLINIC | Age: 63
End: 2018-06-27

## 2018-06-27 NOTE — TELEPHONE ENCOUNTER
----- Message from Salvatore Thornton sent at 6/27/2018  9:25 AM CDT -----  Contact: same  Patient called in and stated it is time for her 3 month appt with Dr. Garcia.  Patient is established patient with [facility].    System would not allow us to schedule.  Call back number is [phone] ([phone])

## 2018-06-29 ENCOUNTER — TELEPHONE (OUTPATIENT)
Dept: NEUROLOGY | Facility: CLINIC | Age: 63
End: 2018-06-29

## 2018-06-29 NOTE — TELEPHONE ENCOUNTER
----- Message from Elena Rhodes sent at 6/29/2018  9:57 AM CDT -----  Contact: Patient  Type: Needs Medical Advice    Who Called:  Patient  Symptoms (please be specific):  na  How long has patient had these symptoms:  na  Pharmacy name and phone #:  na  Best Call Back Number:  or   Additional Information: Calling to schedule a f/u to check the progression of the disease. Please advise.

## 2018-07-19 ENCOUNTER — OFFICE VISIT (OUTPATIENT)
Dept: NEUROLOGY | Facility: CLINIC | Age: 63
End: 2018-07-19
Payer: COMMERCIAL

## 2018-07-19 VITALS
DIASTOLIC BLOOD PRESSURE: 77 MMHG | HEART RATE: 131 BPM | HEIGHT: 62 IN | BODY MASS INDEX: 20.61 KG/M2 | SYSTOLIC BLOOD PRESSURE: 126 MMHG | WEIGHT: 112 LBS

## 2018-07-19 DIAGNOSIS — G24.9 DYSTONIA: ICD-10-CM

## 2018-07-19 DIAGNOSIS — R63.4 WEIGHT LOSS, ABNORMAL: ICD-10-CM

## 2018-07-19 DIAGNOSIS — G89.29 CHRONIC RIGHT SHOULDER PAIN: ICD-10-CM

## 2018-07-19 DIAGNOSIS — M25.511 CHRONIC RIGHT SHOULDER PAIN: ICD-10-CM

## 2018-07-19 DIAGNOSIS — T42.8X5A LEVODOPA-INDUCED DYSKINESIA: ICD-10-CM

## 2018-07-19 DIAGNOSIS — G24.01 LEVODOPA-INDUCED DYSKINESIA: ICD-10-CM

## 2018-07-19 DIAGNOSIS — R47.1 DYSARTHRIA: ICD-10-CM

## 2018-07-19 DIAGNOSIS — R13.12 OROPHARYNGEAL DYSPHAGIA: ICD-10-CM

## 2018-07-19 DIAGNOSIS — G25.81 RLS (RESTLESS LEGS SYNDROME): ICD-10-CM

## 2018-07-19 DIAGNOSIS — G20.A1 PARKINSON'S DISEASE: Primary | ICD-10-CM

## 2018-07-19 DIAGNOSIS — F32.1 MODERATE SINGLE CURRENT EPISODE OF MAJOR DEPRESSIVE DISORDER: ICD-10-CM

## 2018-07-19 PROCEDURE — 99999 PR PBB SHADOW E&M-EST. PATIENT-LVL III: CPT | Mod: PBBFAC,,, | Performed by: PSYCHIATRY & NEUROLOGY

## 2018-07-19 PROCEDURE — 3008F BODY MASS INDEX DOCD: CPT | Mod: CPTII,S$GLB,, | Performed by: PSYCHIATRY & NEUROLOGY

## 2018-07-19 PROCEDURE — 99214 OFFICE O/P EST MOD 30 MIN: CPT | Mod: S$GLB,,, | Performed by: PSYCHIATRY & NEUROLOGY

## 2018-07-19 RX ORDER — HYDROCODONE BITARTRATE AND ACETAMINOPHEN 5; 325 MG/1; MG/1
1 TABLET ORAL EVERY 6 HOURS PRN
Qty: 120 TABLET | Refills: 0 | Status: SHIPPED | OUTPATIENT
Start: 2018-07-19 | End: 2018-11-01

## 2018-07-19 NOTE — PATIENT INSTRUCTIONS
"1. Your movements in right arm and leg are from rytary and carbidopa-levodopa.  Thus, I want you to see what happens if you REDUCE rytary by taking only one for 2 of the doses during the day.    For example, take 2, 1, 2, 1.      2. Though the depression is uncontrolled, I can't safely add any medications without interactions.    3. For the choking and "strangling" you will need to do a swallowing study.  Someone will contact you to schedule.    4.  For the right arm pain, I am prescribing lortab.  "

## 2018-07-19 NOTE — ASSESSMENT & PLAN NOTE
Progression in symptoms, specifically the dystonia of face, arm, leg.  I'm still not convinced she has a typical PD, but regardless of etiology, her treatment is limited now by the dyskinesias (previously by nausea).   -> advised to see if reducing rytary has any benefit or detriment (see instructions).

## 2018-07-19 NOTE — ASSESSMENT & PLAN NOTE
Uncontrolled, but I don't see any options for her- there are too many potential medication interactions.

## 2018-07-19 NOTE — PROGRESS NOTES
"Awilda CONSTANTINO Chief Complaints during this visit:  f/u Patient visit for  Parkinsonism      Referring Physician:     No referring provider defined for this encounter.       Primary Care Physician:  Gato Ivan MD  [id number] [facility] [id number]      History of present illness:   63 y.o.  W seen in f/u for Parkinsonism, probable CBD.  Accompanied by sitter.    Getting "strangled" a lot.  Right arm hurts her bad.    Gets lightheaded, dizzy occasionally.  No faints.  Depression uncontrolled.  Forgetful.  Sleeps soundly 7pm-7am.    Interval history 3/8/18:  Accompanied by sitter.  Pain in right shoulder not relieved by recent injection.  Taking 2 rytary 4x/day (PCP took it off her list, but she and sitter confirm she is taking).  Not sure about sinemet.    Physically, "about the same."  Her main complaint is her right shoulder pain.  She believes her  is under too much pressure and she is a burden; however, he tells her that she is not.     Interval history 11/17/17:  Saw Chester Hagen in interim, put her on rytary which she felt was really helpful for a couple months, but now does not think it works.  She was on 2 of the 95's qid, but now on one of the 195's qid.    From Jon's note 9/13/17:  Last seen in office 8-7-17. At that time, discussed reduction of cd/ld CR to see if it helped alleviate any of her symptoms or if she could see that it actually provided any benefit. She also had an increased HR at that time, as is the case again today.    Since last visit, she went to a  who recommended she see another doctor. She saw Dr. Hagen.   He wanted her off CR for 3 days before seeing her. She fell 12 times, was dizzier and felt awful without it. Friend says she deteriorated so bad that she was nearly bed ridden.    By her account, Dr. Hagen felt she had PD, did not mention parkinsonism and had her begin Rytary 23.75 mg/ 95 mg 1 capsule QID and CR 25/100 QID. Doses 7-11-5-9.  She says " "everything is better on it. She She vomited twice when began regimen and continues with intermittent nausea.   She has not regained normal diet. She is still eating soft foods and fresh fruits. She had gotten down to 103 lbs and is now up to 105 lbs on her scale.      Interval history 5/25/17:  Very "ansy" in the afternoons/ evenings and says her legs are "ansy."  Still easy lability.  Double dose of sinemet at noon did not help.    Interval history 1/31/17:  Accompanied by a friend.  Has been seeing Karina in the interim who has been wondering if patient may have MSA.  One fall last month when tripping at top of stairs.  Lability has improved with celexa.  Sinemet CR + carbidopa has reduced nausea and she is tolerating.  However not sure the 4th dose of sinemet has had any effect.  Voice has been changing in last couple of weeks.  No trouble with swallowing.    Home orthostatics:  1/29:  130/70, 96 laying down, 122/66, 94 standing  1/30:  126/70, 92 laying down, 110/70, 100 standing    Interval history 5/26/16:  Complains of severe spasm mid back every afternoon into the evening for past couple months.   says she is tender on spinous process, but unable to detect a spasming muscle.  Finally goes away when her restoril takes effect.  Mobic helps a little.  Wakes up intermittently at night, 1-3am, wide awake.  Restless and difficulty getting comfortable in the evenings.  Says feels like "locusts" all over her.    Right hand ring finger as well as foot tingling and numb.  Never started the neudexta because of potential interaction with selegiline.  Despite this, she has done better with tears.    From my note 2/12/16:  Annoyed by a restless feeling in her right foot that worsens as she prepares to bed.  Also having severe insomnia.  Right hand is still swollen and had poor dexterity, and right foot rolling in, but no pain in right side any longer.  Cries easily, whether sad or happy.  She denies feeling " "depressed and is resistant to going back on prozac.    From my note 12/2/15:  ... seen in consultation at the request of  Dr. Segovia () for evaluation of joint pains and fatigue.  Symptoms started as joint pain, swelling in right hand about 6-8 months ago.  When typing, letters skipped with right hand.  Otherwise feels "fine."  Right arm, hand, leg, foot still have pain, intermittently.  Yoga feels good.  Seen by Kelley and given presumptive diagnosis of PD.  Had control of depression with wellbutrin and prozac, now off in past 3 weeks.  Recent active dreaming (screaming, laughing, kicks).  Not falling asleep any more.  No problems with voice.       II.  Review of systems:  As in HPI, otherwise, balance 3 systems reviewed and are negative.    III.  Past Medical History:   Diagnosis Date    H/O hysterectomy for benign disease     Menopause present     Occult blood in stools     Osteopenia     Parkinson disease 12/2015    Sprain of left foot     2008     Family History   Problem Relation Age of Onset    Diabetes Mother     Cataracts Mother     Hypertension Mother     Cancer Maternal Aunt         breast    Cancer Maternal Uncle         brain , bone     Cancer Paternal Aunt         uterine    Diabetes Maternal Grandfather     Cataracts Father      Social History     Social History    Marital status:      Spouse name: N/A    Number of children: N/A    Years of education: N/A     Social History Main Topics    Smoking status: Never Smoker    Smokeless tobacco: Never Used    Alcohol use 0.6 oz/week     1 Glasses of wine per week      Comment: social    Drug use: No    Sexual activity: Yes     Partners: Male     Other Topics Concern    None     Social History Narrative    Works for [employer]          Current Outpatient Prescriptions   Medication Sig Dispense Refill    baclofen (LIORESAL) 10 MG tablet Take 1 tablet (10 mg total) by mouth 3 (three) times daily. 270 tablet 3    carbidopa " (LODOSYN) 25 mg tablet Take 1 tablet (25 mg total) by mouth 4 (four) times daily. 360 tablet 3    carbidopa-levodopa (RYTARY) 23.75-95 mg CpSR Take 2 capsules by mouth 4 (four) times daily. 720 capsule 3    carbidopa-levodopa  mg (SINEMET CR)  mg TbSR Take 1 tablet by mouth 2 (two) times daily. 1 tablet twice daily 180 tablet 3    DULoxetine (CYMBALTA) 60 MG capsule Take 1 capsule (60 mg total) by mouth once daily. 90 capsule 3    gabapentin (NEURONTIN) 300 MG capsule Take 2 capsules (600 mg total) by mouth every evening. 180 capsule 3    megestrol (MEGACE) 400 mg/10 mL (40 mg/mL) Susp take 10 ml's (400 mg) by mouth once daily 900 mL 1    pantoprazole (PROTONIX) 40 MG tablet Take 1 po bid x 1 month then 1 po q day 120 tablet 2    pantoprazole (PROTONIX) 40 MG tablet Take 1 tablet (40 mg total) by mouth once daily. 30 tablet 11    temazepam (RESTORIL) 30 mg capsule Take 1 capsule (30 mg total) by mouth nightly as needed for Insomnia. 90 capsule 1    traMADol (ULTRAM) 50 mg tablet Take 1 tablet (50 mg total) by mouth 4 (four) times daily. 360 tablet 1    cephALEXin (KEFLEX) 500 MG capsule Take 1 capsule (500 mg total) by mouth every 6 (six) hours. 40 capsule 0    diclofenac sodium (VOLTAREN) 1 % Gel Apply 2 g topically 4 (four) times daily as needed. 300 g 3    ESTRING 2 mg (7.5 mcg /24 hour) vaginal ring INSERT 1 VAGINAL RING(S) EVERY 3 MONTHS 1 each 3    HYDROcodone-acetaminophen (NORCO) 5-325 mg per tablet Take 1 tablet by mouth every 6 (six) hours as needed for Pain. 120 tablet 0    hydrocortisone 2.5 % cream Apply topically 2 (two) times daily. 28 g 2    promethazine (PHENERGAN) 25 MG tablet Take 1 tablet (25 mg total) by mouth every 6 (six) hours as needed for Nausea. 6 tablet 0     No current facility-administered medications for this visit.       PRIOR NEURO MEDICATIONS TRIED:  none    Review of patient's allergies indicates:   Allergen Reactions    Silver nitrate Anaphylaxis      "Put wholes skin     Demerol [meperidine]      Can worsen PD    Reglan [metoclopramide hcl]      Can worsen PD    Risperdal [risperidone]      Can worsen PD         IV. Physical Exam (Includes Motor Unified Parkinson's Disease Rating Scale 2008)  Patient taking PD meds?         Vitals:    07/19/18 1126   BP: 126/77   Pulse: (!) 131   Weight: 50.8 kg (112 lb)   Height: 5' 1.5" (1.562 m)       Wt Readings from Last 10 Encounters:   07/19/18 50.8 kg (112 lb)   05/24/18 51.7 kg (114 lb)   03/13/18 50.3 kg (110 lb 12.5 oz)   03/08/18 46.6 kg (102 lb 11.2 oz)   02/27/18 51.3 kg (113 lb)   02/05/18 52.2 kg (115 lb 1.3 oz)   01/31/18 52.2 kg (115 lb)   12/27/17 44 kg (97 lb)   11/27/17 45.9 kg (101 lb 3.1 oz)   11/17/17 45.6 kg (100 lb 8.5 oz)      General appearance:  thin, no acute distress       -------------------------------------------------------------  Affect: full       Orientation to time & place:  Oriented to time, place, person and situation       Attention & concentration:  Normal attention span and concentration       Memory not tested  Speech is aprosodic  -------------------------------------------------------  Cranial nerves: normal visual acuity, visual fields full, optic discs not visualized, pupils equal round and reactive, extraocular movements intact,       facial sensation intact, face symmetrical, hearing intact to whisper, palate raises midline, shoulder shrug strength normal, tongue protrudes midline.        -------------------------------------------------------  Muscle Bulk: all 4 extremities normal                                                   Muscle strength:  5/5 in all 4 extremities        No pronator drift    --------------------------------------------------------------  Unified Parkinson's Disease Rating Scale (motor part only)      UPDRS Motor Examination      Speech  Loss of prosody   Facial Expression  0 - Normal.   Rigidity     Neck 0 - Absent.   Upper Extremity: Right 2 - Mild " or moderate.   Upper Extremity: Left 0 - Absent.   Lower Extremity: Right 2 - Mild or moderate.   Lower Extremity: Left 0 - Absent.     Finger Taps      right 3 - Severely impaired. Frequent hesitation in initiating movements or arrests in ongoing movement.   left 0 - Normal.   Hand Movements      right 3 - Severely impaired. Frequent hesitation in initiating movements or arrests in ongoing movement.   left 0 - Normal.   Pronation/supination of Hands      right 3 - Severely impaired. Frequent hesitation in initiating movements or arrests in ongoing movement.   left 0 - Normal.     Toe Tapping    right 3 - Severely impaired. Frequent hesitation in initiating movements or arrests in ongoing movement.   left 0 - Normal.     Leg Agility      right 2 - Moderately impaired. Definite and early fatiguing. May have occasional arrests in movement.   left 0 - Normal.   Arising from Chair  0 - Normal.   Posture  0 - Normal erect.   Gait  1 - Walks slowly, may shuffle with short steps, but no festination (hastening steps) or propulsion.   Freezing of gait 0: Normal: No freezing.    Postural Stability (Response to sudden, strong posterior displacement produced by pull on shoulders while patient erect with eyes open and feet slightly apart.   1 - Retropulsion, but recovers unaided.    Body Bradykinesia and Hypokinesia (Combining slowness, hesitancy, decreased armswing, small amplitude, and poverty of movement in general)  1 - Minimal slowness, giving movement a deliberate character; could be normal for some persons. Possibly reduced amplitude.     Tremor at Rest:      Face, lips, chin 0 - Absent.    Hands:      right 0 - Absent.    left 0 - Absent.    Feet:     right 0 - Absent.    left 0 - Absent.    Constancy of REST tremor: 0: Normal: No tremor.   Postural tremor:    right 0 - Absent.    left 0 - Absent.    Kinetic tremor:    right 0 - Absent.    left 0 - Absent.    Dyskinesias present? Yes, moderate, Right foot           V.   "Laboratory/ Radiological Data:     11/17/17 paraneoplastic panel neg.    MRI brain 2017:  A there is narrowing and blurring of the left and right pars compacta with diminished distinction between the substantia nigra and red nucleus bilaterally.  There is also hypointense signal present within the posterior basal ganglia bilaterally on the FLAIR pulse sequences.  These findings likely related to the provided history of Parkinson's disease        MRI brain 2015 personally reviewed and midbrain appears normal:            Lab Results   Component Value Date    TSH 1.287 09/26/2017         VI.     Problem List Items Addressed This Visit        1 - High    Parkinsonism - Primary    Overview     Right side effected (rigidity, dystonia).  Probable CBD         Current Assessment & Plan     Progression in symptoms, specifically the dystonia of face, arm, leg.  I'm still not convinced she has a typical PD, but regardless of etiology, her treatment is limited now by the dyskinesias (previously by nausea).   -> advised to see if reducing rytary has any benefit or detriment (see instructions).            Levodopa-induced dyskinesia    Overview     Right foot dyskinesias         Current Assessment & Plan     As above.         Dystonia    Overview     Facial, right arm either secondary to parkinsonism/ cbd         Relevant Medications    HYDROcodone-acetaminophen (NORCO) 5-325 mg per tablet    Chronic right shoulder pain    Overview     (dystonic)         Current Assessment & Plan     Pain in rue is bad.  We don't have good options outside of opiates.   -> start prn hydrocodone         Relevant Medications    HYDROcodone-acetaminophen (NORCO) 5-325 mg per tablet       2     Oropharyngeal dysphagia    Current Assessment & Plan     Worsening dysphagia, "strangling."   -> MBSS, ST         Relevant Orders    Fl Modified Barium Swallow Speech    Ambulatory referral to Speech Therapy       3     RLS (restless legs syndrome)    Overview " "    Restlessness, "creepy crawlies"         Current Assessment & Plan     Controlled.         Weight loss, abnormal    Overview     6/2016 134lb  5/25/17 114lb  10/17/17 100lb         Current Assessment & Plan     Stable at 114.            5     Moderate single current episode of major depressive disorder    Current Assessment & Plan     Uncontrolled, but I don't see any options for her- there are too many potential medication interactions.           Other Visit Diagnoses     Dysarthria                Follow-up in about 3 months (around 10/19/2018) for parkinsonism.   "

## 2018-09-11 ENCOUNTER — TELEPHONE (OUTPATIENT)
Dept: NEUROLOGY | Facility: CLINIC | Age: 63
End: 2018-09-11

## 2018-09-11 NOTE — TELEPHONE ENCOUNTER
----- Message from Tico Segovia MD sent at 9/11/2018  2:45 PM CDT -----  Sorry to bother you-hope you are well. Awilda asked me to message you to say her stiffness and weakness is progressing -and what else can we do? I've tried to tell her you have done everything there is to do but I promised her I would ask. FWIW her dyskinesia didn't seem to improve with the reductions in rytary and sinemet CR- but the foot twithching doesn't seem to bother her at all. I don't know if you thought the biceps spasms were related. Thanks for your help, Juanjo Segovia

## 2018-09-13 ENCOUNTER — OFFICE VISIT (OUTPATIENT)
Dept: OPTOMETRY | Facility: CLINIC | Age: 63
End: 2018-09-13
Payer: COMMERCIAL

## 2018-09-13 DIAGNOSIS — H52.03 HYPEROPIA WITH ASTIGMATISM AND PRESBYOPIA, BILATERAL: ICD-10-CM

## 2018-09-13 DIAGNOSIS — H43.393 VITREOUS FLOATERS, BILATERAL: ICD-10-CM

## 2018-09-13 DIAGNOSIS — H52.203 HYPEROPIA WITH ASTIGMATISM AND PRESBYOPIA, BILATERAL: ICD-10-CM

## 2018-09-13 DIAGNOSIS — H25.13 NUCLEAR SCLEROSIS, BILATERAL: Primary | ICD-10-CM

## 2018-09-13 DIAGNOSIS — H16.213 EXPOSURE KERATOCONJUNCTIVITIS OF BOTH EYES: ICD-10-CM

## 2018-09-13 DIAGNOSIS — Z13.5 GLAUCOMA SCREENING: ICD-10-CM

## 2018-09-13 DIAGNOSIS — H52.4 HYPEROPIA WITH ASTIGMATISM AND PRESBYOPIA, BILATERAL: ICD-10-CM

## 2018-09-13 PROCEDURE — 92014 COMPRE OPH EXAM EST PT 1/>: CPT | Mod: S$GLB,,, | Performed by: OPTOMETRIST

## 2018-09-13 PROCEDURE — 99999 PR PBB SHADOW E&M-EST. PATIENT-LVL III: CPT | Mod: PBBFAC,,, | Performed by: OPTOMETRIST

## 2018-09-13 NOTE — PROGRESS NOTES
HPI     Annual Exam      Additional comments: DLE 6-16 (matthias)   ocular health exam               Blurred Vision      Additional comments: at near only --- distance VA good              Dry Eye      Additional comments: OU excessive tearing -- no gtts              Eye Problem      Additional comments: started seeing light flashes OS x 3 weeks -- no   floaters, eye pain or loss of vision              Comments     Agree above  Notes VA seems stable w/ glasses  Some           Last edited by LONI Leone, OD on 9/13/2018 12:10 PM. (History)        ROS     Positive for: Eyes    Negative for: Constitutional, Gastrointestinal, Neurological, Skin,   Genitourinary, Musculoskeletal, HENT, Endocrine, Cardiovascular,   Respiratory, Psychiatric, Allergic/Imm, Heme/Lymph    Last edited by LONI Leone, OD on 9/13/2018 11:33 AM. (History)        Assessment /Plan     For exam results, see Encounter Report.    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 Early changes, not vis sig for consult  2. Mild SPK due to dry eye, infrequent blink  Gave otc suggestions for ATs , use tid+   3. RD precautions given  4. Not suspect  5. Updated specs rx, gave copy, fill prn    Discussed and educated patient on current findings /plan.  RTC 1 year, prn if any changes / issues

## 2018-09-18 DIAGNOSIS — G47.00 INSOMNIA, UNSPECIFIED TYPE: ICD-10-CM

## 2018-09-18 RX ORDER — TEMAZEPAM 30 MG/1
30 CAPSULE ORAL NIGHTLY PRN
Qty: 90 CAPSULE | Refills: 1 | Status: SHIPPED | OUTPATIENT
Start: 2018-09-18

## 2018-10-31 ENCOUNTER — LAB VISIT (OUTPATIENT)
Dept: LAB | Facility: HOSPITAL | Age: 63
End: 2018-10-31
Attending: FAMILY MEDICINE
Payer: COMMERCIAL

## 2018-10-31 ENCOUNTER — OFFICE VISIT (OUTPATIENT)
Dept: FAMILY MEDICINE | Facility: CLINIC | Age: 63
End: 2018-10-31
Payer: COMMERCIAL

## 2018-10-31 VITALS — HEART RATE: 156 BPM | TEMPERATURE: 99 F | SYSTOLIC BLOOD PRESSURE: 99 MMHG | DIASTOLIC BLOOD PRESSURE: 65 MMHG

## 2018-10-31 DIAGNOSIS — R10.9 FLANK PAIN: ICD-10-CM

## 2018-10-31 DIAGNOSIS — R00.0 TACHYCARDIA: ICD-10-CM

## 2018-10-31 DIAGNOSIS — R10.9 FLANK PAIN: Primary | ICD-10-CM

## 2018-10-31 DIAGNOSIS — Z23 IMMUNIZATION DUE: ICD-10-CM

## 2018-10-31 LAB
ALBUMIN SERPL BCP-MCNC: 3.9 G/DL
ALP SERPL-CCNC: 108 U/L
ALT SERPL W/O P-5'-P-CCNC: 9 U/L
ANION GAP SERPL CALC-SCNC: 17 MMOL/L
AST SERPL-CCNC: 12 U/L
BILIRUB SERPL-MCNC: 1.2 MG/DL
BUN SERPL-MCNC: 20 MG/DL
CALCIUM SERPL-MCNC: 10.3 MG/DL
CHLORIDE SERPL-SCNC: 102 MMOL/L
CO2 SERPL-SCNC: 18 MMOL/L
CREAT SERPL-MCNC: 1.1 MG/DL
EST. GFR  (AFRICAN AMERICAN): >60 ML/MIN/1.73 M^2
EST. GFR  (NON AFRICAN AMERICAN): 52.8 ML/MIN/1.73 M^2
GLUCOSE SERPL-MCNC: 113 MG/DL
POTASSIUM SERPL-SCNC: 3.6 MMOL/L
PROT SERPL-MCNC: 7.7 G/DL
SODIUM SERPL-SCNC: 137 MMOL/L

## 2018-10-31 PROCEDURE — 85027 COMPLETE CBC AUTOMATED: CPT

## 2018-10-31 PROCEDURE — 99213 OFFICE O/P EST LOW 20 MIN: CPT | Mod: 25,S$GLB,, | Performed by: NURSE PRACTITIONER

## 2018-10-31 PROCEDURE — 80053 COMPREHEN METABOLIC PANEL: CPT

## 2018-10-31 PROCEDURE — 90471 IMMUNIZATION ADMIN: CPT | Mod: S$GLB,,, | Performed by: NURSE PRACTITIONER

## 2018-10-31 PROCEDURE — 90662 IIV NO PRSV INCREASED AG IM: CPT | Mod: S$GLB,,, | Performed by: NURSE PRACTITIONER

## 2018-10-31 PROCEDURE — 81000 URINALYSIS NONAUTO W/SCOPE: CPT | Mod: PO

## 2018-10-31 PROCEDURE — 1100F PTFALLS ASSESS-DOCD GE2>/YR: CPT | Mod: CPTII,S$GLB,, | Performed by: NURSE PRACTITIONER

## 2018-10-31 PROCEDURE — 99999 PR PBB SHADOW E&M-EST. PATIENT-LVL IV: CPT | Mod: PBBFAC,,, | Performed by: NURSE PRACTITIONER

## 2018-10-31 PROCEDURE — 36415 COLL VENOUS BLD VENIPUNCTURE: CPT | Mod: PO

## 2018-10-31 PROCEDURE — 3288F FALL RISK ASSESSMENT DOCD: CPT | Mod: CPTII,S$GLB,, | Performed by: NURSE PRACTITIONER

## 2018-10-31 PROCEDURE — 85007 BL SMEAR W/DIFF WBC COUNT: CPT

## 2018-11-01 ENCOUNTER — TELEPHONE (OUTPATIENT)
Dept: FAMILY MEDICINE | Facility: CLINIC | Age: 63
End: 2018-11-01

## 2018-11-01 PROBLEM — F03.90 MAJOR NEUROCOGNITIVE DISORDER: Status: ACTIVE | Noted: 2018-11-01

## 2018-11-01 PROBLEM — A41.9 SEPTIC SHOCK: Status: ACTIVE | Noted: 2018-11-01

## 2018-11-01 PROBLEM — N17.9 AKI (ACUTE KIDNEY INJURY): Status: ACTIVE | Noted: 2018-11-01

## 2018-11-01 PROBLEM — R65.21 SEPTIC SHOCK: Status: ACTIVE | Noted: 2018-11-01

## 2018-11-01 PROBLEM — N30.00 ACUTE CYSTITIS WITHOUT HEMATURIA: Status: ACTIVE | Noted: 2018-11-01

## 2018-11-01 PROBLEM — I95.9 HYPOTENSION: Status: ACTIVE | Noted: 2018-11-01

## 2018-11-01 LAB
ANISOCYTOSIS BLD QL SMEAR: SLIGHT
BACTERIA #/AREA URNS HPF: ABNORMAL /HPF
BASO STIPL BLD QL SMEAR: ABNORMAL
BASOPHILS NFR BLD: 0 %
BILIRUB UR QL STRIP: NEGATIVE
BURR CELLS BLD QL SMEAR: ABNORMAL
CLARITY UR: ABNORMAL
COLOR UR: YELLOW
DIFFERENTIAL METHOD: ABNORMAL
EOSINOPHIL NFR BLD: 0.5 %
ERYTHROCYTE [DISTWIDTH] IN BLOOD BY AUTOMATED COUNT: 13.3 %
GIANT PLATELETS BLD QL SMEAR: PRESENT
GLUCOSE UR QL STRIP: NEGATIVE
HCT VFR BLD AUTO: 37.6 %
HGB BLD-MCNC: 11.9 G/DL
HGB UR QL STRIP: ABNORMAL
HYALINE CASTS #/AREA URNS LPF: 0 /LPF
IMM GRANULOCYTES # BLD AUTO: ABNORMAL K/UL
IMM GRANULOCYTES NFR BLD AUTO: ABNORMAL %
KETONES UR QL STRIP: NEGATIVE
LEUKOCYTE ESTERASE UR QL STRIP: ABNORMAL
LYMPHOCYTES NFR BLD: 5 %
MCH RBC QN AUTO: 29 PG
MCHC RBC AUTO-ENTMCNC: 31.6 G/DL
MCV RBC AUTO: 92 FL
METAMYELOCYTES NFR BLD MANUAL: 0.5 %
MICROSCOPIC COMMENT: ABNORMAL
MONOCYTES NFR BLD: 2 %
NEUTROPHILS NFR BLD: 82.5 %
NEUTS BAND NFR BLD MANUAL: 9.5 %
NITRITE UR QL STRIP: NEGATIVE
NRBC BLD-RTO: 0 /100 WBC
PH UR STRIP: 6 [PH] (ref 5–8)
PLATELET # BLD AUTO: 433 K/UL
PLATELET BLD QL SMEAR: ABNORMAL
PMV BLD AUTO: 9.1 FL
POIKILOCYTOSIS BLD QL SMEAR: SLIGHT
POLYCHROMASIA BLD QL SMEAR: ABNORMAL
PROT UR QL STRIP: ABNORMAL
RBC # BLD AUTO: 4.1 M/UL
RBC #/AREA URNS HPF: 5 /HPF (ref 0–4)
SP GR UR STRIP: 1.02 (ref 1–1.03)
SQUAMOUS #/AREA URNS HPF: 2 /HPF
URN SPEC COLLECT METH UR: ABNORMAL
WBC # BLD AUTO: 12.89 K/UL
WBC #/AREA URNS HPF: 60 /HPF (ref 0–5)

## 2018-11-02 PROBLEM — Z51.5 PALLIATIVE CARE ENCOUNTER: Status: ACTIVE | Noted: 2018-11-02

## 2018-11-02 PROBLEM — E87.8 ELECTROLYTE ABNORMALITY: Status: ACTIVE | Noted: 2018-11-02

## 2018-11-03 PROBLEM — R13.10 SWALLOWING DYSFUNCTION: Status: ACTIVE | Noted: 2018-11-03

## 2018-11-04 PROBLEM — D69.6 THROMBOCYTOPENIA: Status: ACTIVE | Noted: 2018-11-04

## 2018-11-07 ENCOUNTER — TELEPHONE (OUTPATIENT)
Dept: NEUROLOGY | Facility: CLINIC | Age: 63
End: 2018-11-07

## 2018-11-07 PROBLEM — Z51.5 COMFORT MEASURES ONLY STATUS: Status: ACTIVE | Noted: 2018-11-07

## 2018-11-07 NOTE — TELEPHONE ENCOUNTER
----- Message from Pawel SAMANTA Lacy sent at 11/7/2018  9:47 AM CST -----  Needs Advice    Reason for call: Tessie states Dr. Segovia is asking to speak w/ Dr. Garcia regarding the pt. Pls call  Dr. Segovia at         Communication Preference: Tessie saravia/ [facility] in Dr. Segovia's office @ [phone]    Additional Information:

## 2018-11-07 NOTE — TELEPHONE ENCOUNTER
Copied also to:    Awilda Segovia [id number]       Spoke to Dr. Segovia,  of patient.  Not doing well, moderate dysphagia.  Has questions about crushing meds, dysphagia, hospice.    rytary can be opened, sinemet IR crushed, but CR cannot be crushed.  Is it worth trying the rytary (had been held when she was dysphagic), opened?  I think it is worth trying, though unlikely to improve ability to swallow as she was having some problems even when taking it regularly.    3 years, rapid parkinsonism with rigidity, apraxia.  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

## 2018-11-08 NOTE — PROGRESS NOTES
Subjective:       Patient ID: Awilda Segovia is a 63 y.o. female.    Chief Complaint: Back Pain    Flank Pain   This is a new problem. The current episode started in the past 7 days. The problem occurs constantly. The problem is unchanged. The pain is present in the costovertebral angle. The quality of the pain is described as aching. The pain does not radiate. Pertinent negatives include no abdominal pain, chest pain, dysuria, fever or headaches. She has tried nothing for the symptoms.     She is also requesting a flu shot while she is in the office    Review of Systems   Constitutional: Negative for fatigue, fever and unexpected weight change.   HENT: Negative for ear pain and sore throat.    Eyes: Negative for pain and visual disturbance.   Respiratory: Negative for cough and shortness of breath.    Cardiovascular: Negative for chest pain and palpitations.   Gastrointestinal: Negative for abdominal pain, diarrhea and vomiting.   Genitourinary: Positive for flank pain. Negative for dysuria.   Musculoskeletal: Negative for arthralgias and myalgias.   Skin: Negative for color change and rash.   Neurological: Negative for dizziness and headaches.   Psychiatric/Behavioral: Negative for dysphoric mood and sleep disturbance. The patient is not nervous/anxious.        Vitals:    10/31/18 1441   BP: 99/65   Pulse: (!) 156   Temp: 98.9 °F (37.2 °C)       Objective:     Current Outpatient Medications   Medication Sig Dispense Refill    baclofen (LIORESAL) 10 MG tablet Take 1 tablet (10 mg total) by mouth 3 (three) times daily. 270 tablet 3    carbidopa (LODOSYN) 25 mg tablet Take 1 tablet (25 mg total) by mouth 4 (four) times daily. 360 tablet 3    carbidopa-levodopa (RYTARY) 23.75-95 mg CpSR Take 2 capsules by mouth 4 (four) times daily. 720 capsule 3    carbidopa-levodopa  mg (SINEMET CR)  mg TbSR Take 1 tablet by mouth 2 (two) times daily. 1 tablet twice daily 180 tablet 3    amoxicillin-clavulanate  875-125mg (AUGMENTIN) 875-125 mg per tablet Take 1 tablet by mouth 2 (two) times daily. 14 tablet 0    baclofen (LIORESAL) 10 MG tablet Take 10 mg by mouth every evening.      carbidopa (LODOSYN) 25 mg tablet Take 25 mg by mouth 4 (four) times daily.      carbidopa-levodopa (RYTARY) 23.75-95 mg CpSR Take 2 capsules by mouth 4 (four) times daily.      carbidopa-levodopa  mg (SINEMET CR)  mg TbSR Take 1 tablet by mouth 2 (two) times daily.      DULoxetine (CYMBALTA) 60 MG capsule Take 1 capsule (60 mg total) by mouth once daily. 90 capsule 3    gabapentin (NEURONTIN) 300 MG capsule Take 2 capsules (600 mg total) by mouth every evening. 180 capsule 3    gabapentin (NEURONTIN) 300 MG capsule Take 300 mg by mouth every evening.      megestrol (MEGACE) 400 mg/10 mL (40 mg/mL) Susp take 10 ml's (400 mg) by mouth once daily 900 mL 1    temazepam (RESTORIL) 22.5 MG capsule Take 30 mg by mouth nightly as needed for Insomnia.      temazepam (RESTORIL) 30 mg capsule Take 1 capsule (30 mg total) by mouth nightly as needed for Insomnia. 90 capsule 1    traMADol (ULTRAM) 50 mg tablet Take 1 tablet (50 mg total) by mouth 4 (four) times daily. 360 tablet 1     No current facility-administered medications for this visit.        Physical Exam   Constitutional: She appears well-developed.   HENT:   Head: Normocephalic.   Eyes: Pupils are equal, round, and reactive to light.   Cardiovascular: Tachycardia present.   Abdominal: Soft. Bowel sounds are normal. There is no tenderness.   Skin: Skin is warm and dry.       Assessment:       1. Flank pain    2. Tachycardia    3. Immunization due        Plan:   Flank pain  -     Urinalysis  -     CBC auto differential; Future; Expected date: 10/31/2018  -     Comprehensive metabolic panel; Future; Expected date: 10/31/2018  -     Urinalysis; Future; Expected date: 10/31/2018    Tachycardia  -     CBC auto differential; Future; Expected date: 10/31/2018  -     Comprehensive  metabolic panel; Future; Expected date: 10/31/2018    Immunization due  -     Influenza - High Dose (65+) (PF) (IM)    Other orders  -     Urinalysis Microscopic      She was unable to collect a urine in the office, she is being sent home with a collection cup to return    No Follow-up on file.    There are no Patient Instructions on file for this visit.

## 2020-09-01 NOTE — ASSESSMENT & PLAN NOTE
"Remains uncontrolled.  An "ansy" feeling in evenings.   -> trial of mirapex   -> consider klonopin or ativan.  " Continue meclizine as needed  No dizziness has been reported

## 2022-07-08 ENCOUNTER — PATIENT MESSAGE (OUTPATIENT)
Dept: FAMILY MEDICINE | Facility: CLINIC | Age: 67
End: 2022-07-08
Payer: COMMERCIAL

## 2022-12-23 ENCOUNTER — PATIENT MESSAGE (OUTPATIENT)
Dept: FAMILY MEDICINE | Facility: CLINIC | Age: 67
End: 2022-12-23
Payer: COMMERCIAL